# Patient Record
Sex: FEMALE | Race: WHITE | NOT HISPANIC OR LATINO | Employment: OTHER | URBAN - METROPOLITAN AREA
[De-identification: names, ages, dates, MRNs, and addresses within clinical notes are randomized per-mention and may not be internally consistent; named-entity substitution may affect disease eponyms.]

---

## 2017-01-16 ENCOUNTER — ONCOLOGY VISIT (OUTPATIENT)
Dept: ONCOLOGY | Facility: CLINIC | Age: 77
End: 2017-01-16
Attending: PHYSICIAN ASSISTANT
Payer: MEDICARE

## 2017-01-16 VITALS
RESPIRATION RATE: 16 BRPM | WEIGHT: 163.5 LBS | DIASTOLIC BLOOD PRESSURE: 82 MMHG | TEMPERATURE: 97.7 F | BODY MASS INDEX: 23.46 KG/M2 | HEART RATE: 72 BPM | SYSTOLIC BLOOD PRESSURE: 161 MMHG | OXYGEN SATURATION: 91 %

## 2017-01-16 DIAGNOSIS — C50.912 MALIGNANT NEOPLASM OF LEFT FEMALE BREAST, UNSPECIFIED SITE OF BREAST: Primary | ICD-10-CM

## 2017-01-16 PROCEDURE — 99213 OFFICE O/P EST LOW 20 MIN: CPT | Mod: ZP | Performed by: PHYSICIAN ASSISTANT

## 2017-01-16 PROCEDURE — 99212 OFFICE O/P EST SF 10 MIN: CPT | Mod: ZF

## 2017-01-16 ASSESSMENT — PAIN SCALES - GENERAL: PAINLEVEL: NO PAIN (0)

## 2017-01-16 NOTE — NURSING NOTE
"Camille Thompson is a 76 year old female who presents for:  Chief Complaint   Patient presents with     Oncology Clinic Visit     Breast Cancer        Initial Vitals:  /82 mmHg  Pulse 72  Temp(Src) 97.7  F (36.5  C) (Oral)  Resp 16  Wt 74.163 kg (163 lb 8 oz)  SpO2 91% Estimated body mass index is 23.46 kg/(m^2) as calculated from the following:    Height as of 9/16/16: 1.778 m (5' 10\").    Weight as of this encounter: 74.163 kg (163 lb 8 oz).. There is no height on file to calculate BSA. BP completed using cuff size: regular  No Pain (0) No LMP recorded. Patient is postmenopausal. Allergies and medications reviewed.     Medications: Medication refills not needed today.  Pharmacy name entered into [facility]:    [facility] - [place], [place] - [address] [id number] - [place], [place] - [address]     Comments: Patient is not having any pain today.     6 minutes for nursing intake (face to face time)   Ynes Camargo CMA         "

## 2017-01-16 NOTE — PROGRESS NOTES
DIAGNOSIS:  Stage I (T1 N0 M0) infiltrating ductal carcinoma of the left breast, ER negative, [place] negative, HER-2/batsheva negative. The patient is status post bilateral mastectomy on 01/29/2015. Pathology revealed an infiltrating ductal carcinoma, [place] grade 3. Tumor size was 1.1 cm. Margins were not involved. She had zero of 5 sentinel lymph nodes positive. The right breast showed no malignancy. The patient completed weekly Taxol x 11 from 03/06/2015 to 05/15/2015. Last dose of Taxol was held due to LE erythema.  She completed dose dense AC (adriamycin/cytoxan) x 4 cycles from 06/05/2015 to 07/24/2015.    INTERVAL HISTORY:  Ms. Thompson returns to clinic today for followup of her breast carcinoma.  She is overall feeling well at this time.  Her review of systems is negative; please see below.  She does continue to have peripheral neuropathy on the bottom of her feet and toes which is stable since treatment.  She also continues to have mild edema but had vein surgery which she states improved the edema.  She is going to get back into an exercise program.     ROS:  Answers for HPI/ROS submitted by the patient on 1/2/2017   General Symptoms: No  Skin Symptoms: No  HENT Symptoms: No  EYE SYMPTOMS: No  HEART SYMPTOMS: No  LUNG SYMPTOMS: No  INTESTINAL SYMPTOMS: No  URINARY SYMPTOMS: No  GYNECOLOGIC SYMPTOMS: No  BREAST SYMPTOMS: No  SKELETAL SYMPTOMS: No  BLOOD SYMPTOMS: No  NERVOUS SYSTEM SYMPTOMS: No  MENTAL HEALTH SYMPTOMS: No    MEDICATIONS:   Current Outpatient Prescriptions   Medication Sig Dispense Refill     Multiple Vitamins-Minerals (MULTIVITAMIN & MINERAL PO) one tablet daily       GABAPENTIN PO Take by mouth daily       triamcinolone (KENALOG) 0.1 % cream Apply twice daily as needed to stasis dermatitis of lower legs (itchy, red skin). Never for face or groin. 80 g 2     furosemide (LASIX) 20 MG tablet Take 1 tablet (20 mg) by mouth 2 times daily 60 tablet 1     AMLODIPINE BESYLATE PO Take 5 mg by mouth daily        cyanocobalamin (VITAMIN  B-12) 1000 MCG tablet Take 1,000 mcg by mouth daily       VITAMIN D, CHOLECALCIFEROL, PO Take 2,000 Units by mouth daily       Citalopram Hydrobromide (CELEXA PO) Take 40 mg by mouth daily Patient takes  0.5 tabs by mouth       Rizatriptan Benzoate (MAXALT-MLT PO) Take 10 mg by mouth           ALLERGIES:    Allergies   Allergen Reactions     Codeine Nausea     Penicillins Dermatitis and Swelling       PHYSICAL EXAMINATION:  Vitals: /82 mmHg  Pulse 72  Temp(Src) 97.7  F (36.5  C) (Oral)  Resp 16  Wt 74.163 kg (163 lb 8 oz)  SpO2 91%  GENERAL:  A pleasant person in no acute distress.   HEENT:  Sclerae are nonicteric.    NECK:  Supple.   LYMPH NODES:  No peripheral lymphadenopathy noted in the axillary, supraclavicular, or cervical regions.   LUNGS:  Clear to auscultation bilaterally.   HEART:  Regular rate and rhythm, with no murmur appreciated.   ABDOMEN:  Bowel sounds are active.  Soft and nontender.  No hepatosplenomegaly or other masses appreciated.  LOWER EXTREMITIES:  Without pitting edema to the knees bilaterally.   NEUROLOGICAL:  Alert/orientated/able to answer all questions.  CN grossly intact.  BREAST:  Bilateral breast reconstructions with implants intact.  No masses or nodules on the anterior chest wall.       IMPRESSION/PLAN:   Stage I (T1 N0 M0) infiltrating ductal carcinoma of the left breast; ER, ND, and HER2/batsheva negative. Ms. Thompson continues to do well at this time.  She does not have any signs or symptoms that would suggest recurrence of her breast carcinoma.  Given her bilateral mastectomies with reconstruction, no routine breast imaging is warranted.  I encouraged 150 minutes of moderate exercise per week for overall health.  I will have her follow up with Dr. Chavez in 4 months.      If there are no interval concerns, the patient will follow up in 4 months.     Brenda Goodwin PA-C

## 2017-01-16 NOTE — Clinical Note
1/16/2017      RE: Camille Thompson  [address] [place] [address] [address] [place] [address]       DIAGNOSIS:  Stage I (T1 N0 M0) infiltrating ductal carcinoma of the left breast, ER negative, [place] negative, HER-2/batsheva negative. The patient is status post bilateral mastectomy on 01/29/2015. Pathology revealed an infiltrating ductal carcinoma, [place] grade 3. Tumor size was 1.1 cm. Margins were not involved. She had zero of 5 sentinel lymph nodes positive. The right breast showed no malignancy. The patient completed weekly Taxol x 11 from 03/06/2015 to 05/15/2015. Last dose of Taxol was held due to LE erythema.  She completed dose dense AC (adriamycin/cytoxan) x 4 cycles from 06/05/2015 to 07/24/2015.    INTERVAL HISTORY:  Ms. Thompson returns to clinic today for followup of her breast carcinoma.  She is overall feeling well at this time.  Her review of systems is negative; please see below.  She does continue to have peripheral neuropathy on the bottom of her feet and toes which is stable since treatment.  She also continues to have mild edema but had vein surgery which she states improved the edema.  She is going to get back into an exercise program.       MEDICATIONS:   Current Outpatient Prescriptions   Medication Sig Dispense Refill     Multiple Vitamins-Minerals (MULTIVITAMIN & MINERAL PO) one tablet daily       GABAPENTIN PO Take by mouth daily       triamcinolone (KENALOG) 0.1 % cream Apply twice daily as needed to stasis dermatitis of lower legs (itchy, red skin). Never for face or groin. 80 g 2     furosemide (LASIX) 20 MG tablet Take 1 tablet (20 mg) by mouth 2 times daily 60 tablet 1     AMLODIPINE BESYLATE PO Take 5 mg by mouth daily       cyanocobalamin (VITAMIN  B-12) 1000 MCG tablet Take 1,000 mcg by mouth daily       VITAMIN D, CHOLECALCIFEROL, PO Take 2,000 Units by mouth daily       Citalopram Hydrobromide (CELEXA PO) Take 40 mg by mouth daily Patient takes  0.5 tabs by mouth       Rizatriptan Benzoate (MAXALT-MLT  PO) Take 10 mg by mouth           ALLERGIES:    Allergies   Allergen Reactions     Codeine Nausea     Penicillins Dermatitis and Swelling       PHYSICAL EXAMINATION:  Vitals: /82 mmHg  Pulse 72  Temp(Src) 97.7  F (36.5  C) (Oral)  Resp 16  Wt 74.163 kg (163 lb 8 oz)  SpO2 91%  GENERAL:  A pleasant person in no acute distress.   HEENT:  Sclerae are nonicteric.    NECK:  Supple.   LYMPH NODES:  No peripheral lymphadenopathy noted in the axillary, supraclavicular, or cervical regions.   LUNGS:  Clear to auscultation bilaterally.   HEART:  Regular rate and rhythm, with no murmur appreciated.   ABDOMEN:  Bowel sounds are active.  Soft and nontender.  No hepatosplenomegaly or other masses appreciated.  LOWER EXTREMITIES:  Without pitting edema to the knees bilaterally.   NEUROLOGICAL:  Alert/orientated/able to answer all questions.  CN grossly intact.  BREAST:  Bilateral breast reconstructions with implants intact.  No masses or nodules on the anterior chest wall.       IMPRESSION/PLAN:   Stage I (T1 N0 M0) infiltrating ductal carcinoma of the left breast; ER, [place], and HER2/batsheva negative. Ms. Thompson continues to do well at this time.  She does not have any signs or symptoms that would suggest recurrence of her breast carcinoma.  Given her bilateral mastectomies with reconstruction, no routine breast imaging is warranted.  I encouraged 150 minutes of moderate exercise per week for overall health.  I will have her follow up with Dr. Chavez in 4 months.      If there are no interval concerns, the patient will follow up in 4 months.     Brenda Goodwin PA-C

## 2017-01-16 NOTE — Clinical Note
1/16/2017       RE: Camille Thompson  [address] [place] [address] [address] [place] [address]     Dear Colleague,    Thank you for referring your patient, Camille Thompsno, to the [facility]. Please see a copy of my visit note below.    DIAGNOSIS:  Stage I (T1 N0 M0) infiltrating ductal carcinoma of the left breast, ER negative, [place] negative, HER-2/batsheva negative. The patient is status post bilateral mastectomy on 01/29/2015. Pathology revealed an infiltrating ductal carcinoma, [place] grade 3. Tumor size was 1.1 cm. Margins were not involved. She had zero of 5 sentinel lymph nodes positive. The right breast showed no malignancy. The patient completed weekly Taxol x 11 from 03/06/2015 to 05/15/2015. Last dose of Taxol was held due to LE erythema.  She completed dose dense AC (adriamycin/cytoxan) x 4 cycles from 06/05/2015 to 07/24/2015.    INTERVAL HISTORY:   Dictation on: 01/16/2017 12:53 PM by: ALVARO LAYNE [[id number]]     ROS:  Answers for HPI/ROS submitted by the patient on 1/2/2017   General Symptoms: No  Skin Symptoms: No  HENT Symptoms: No  EYE SYMPTOMS: No  HEART SYMPTOMS: No  LUNG SYMPTOMS: No  INTESTINAL SYMPTOMS: No  URINARY SYMPTOMS: No  GYNECOLOGIC SYMPTOMS: No  BREAST SYMPTOMS: No  SKELETAL SYMPTOMS: No  BLOOD SYMPTOMS: No  NERVOUS SYSTEM SYMPTOMS: No  MENTAL HEALTH SYMPTOMS: No    MEDICATIONS:   Current Outpatient Prescriptions   Medication Sig Dispense Refill     Multiple Vitamins-Minerals (MULTIVITAMIN & MINERAL PO) one tablet daily       GABAPENTIN PO Take by mouth daily       triamcinolone (KENALOG) 0.1 % cream Apply twice daily as needed to stasis dermatitis of lower legs (itchy, red skin). Never for face or groin. 80 g 2     furosemide (LASIX) 20 MG tablet Take 1 tablet (20 mg) by mouth 2 times daily 60 tablet 1     AMLODIPINE BESYLATE PO Take 5 mg by mouth daily       cyanocobalamin (VITAMIN  B-12) 1000 MCG tablet Take 1,000 mcg by mouth daily       VITAMIN D, CHOLECALCIFEROL, PO Take 2,000  Units by mouth daily       Citalopram Hydrobromide (CELEXA PO) Take 40 mg by mouth daily Patient takes  0.5 tabs by mouth       Rizatriptan Benzoate (MAXALT-MLT PO) Take 10 mg by mouth           ALLERGIES:    Allergies   Allergen Reactions     Codeine Nausea     Penicillins Dermatitis and Swelling       PHYSICAL EXAMINATION:  Vitals: /82 mmHg  Pulse 72  Temp(Src) 97.7  F (36.5  C) (Oral)  Resp 16  Wt 74.163 kg (163 lb 8 oz)  SpO2 91%  GENERAL:  A pleasant person in no acute distress.   HEENT:  Sclerae are nonicteric.    NECK:  Supple.   LYMPH NODES:  No peripheral lymphadenopathy noted in the axillary, supraclavicular, or cervical regions.   LUNGS:  Clear to auscultation bilaterally.   HEART:  Regular rate and rhythm, with no murmur appreciated.   ABDOMEN:  Bowel sounds are active.  Soft and nontender.  No hepatosplenomegaly or other masses appreciated.  LOWER EXTREMITIES:  Without pitting edema to the knees bilaterally.   NEUROLOGICAL:  Alert/orientated/able to answer all questions.  CN grossly intact.  BREAST:  Bilateral breast reconstructions with implants intact.  No masses or nodules on the anterior chest wall.       IMPRESSION/PLAN:    Dictation on: 01/16/2017 12:55 PM by: BRENDA LAYNE [[id number]]     Brenda Layne PA-C    Again, thank you for allowing me to participate in the care of your patient.      Sincerely,    Brenda Layne PA-C

## 2017-03-23 ENCOUNTER — TRANSFERRED RECORDS (OUTPATIENT)
Dept: HEALTH INFORMATION MANAGEMENT | Facility: CLINIC | Age: 77
End: 2017-03-23

## 2017-05-17 ASSESSMENT — ENCOUNTER SYMPTOMS
NUMBNESS: 1
JOINT SWELLING: 0
DISTURBANCES IN COORDINATION: 0
SEIZURES: 0
HOARSE VOICE: 0
TREMORS: 0
TINGLING: 0
SINUS PAIN: 0
TROUBLE SWALLOWING: 0
DOUBLE VISION: 0
EYE WATERING: 0
MUSCLE CRAMPS: 0
MEMORY LOSS: 0
MYALGIAS: 1
WEAKNESS: 0
SMELL DISTURBANCE: 0
TASTE DISTURBANCE: 0
SORE THROAT: 0
STIFFNESS: 0
PARALYSIS: 0
MUSCLE WEAKNESS: 0
NECK PAIN: 0
EYE IRRITATION: 0
SPEECH CHANGE: 0
DIZZINESS: 0
SINUS CONGESTION: 0
BACK PAIN: 0
HEADACHES: 0
EYE PAIN: 0
ARTHRALGIAS: 0
NECK MASS: 0
EYE REDNESS: 0

## 2017-05-19 ENCOUNTER — ONCOLOGY VISIT (OUTPATIENT)
Dept: ONCOLOGY | Facility: CLINIC | Age: 77
End: 2017-05-19
Attending: INTERNAL MEDICINE
Payer: MEDICARE

## 2017-05-19 VITALS
TEMPERATURE: 97.1 F | WEIGHT: 164.6 LBS | OXYGEN SATURATION: 98 % | HEIGHT: 70 IN | DIASTOLIC BLOOD PRESSURE: 76 MMHG | RESPIRATION RATE: 16 BRPM | BODY MASS INDEX: 23.56 KG/M2 | SYSTOLIC BLOOD PRESSURE: 132 MMHG | HEART RATE: 84 BPM

## 2017-05-19 DIAGNOSIS — C50.912 MALIGNANT NEOPLASM OF LEFT FEMALE BREAST, UNSPECIFIED SITE OF BREAST: Primary | ICD-10-CM

## 2017-05-19 PROCEDURE — 99213 OFFICE O/P EST LOW 20 MIN: CPT | Mod: GC | Performed by: INTERNAL MEDICINE

## 2017-05-19 PROCEDURE — 99212 OFFICE O/P EST SF 10 MIN: CPT | Mod: ZF

## 2017-05-19 RX ORDER — RIZATRIPTAN BENZOATE 10 MG/1
TABLET ORAL
COMMUNITY
End: 2017-05-19

## 2017-05-19 ASSESSMENT — PAIN SCALES - GENERAL: PAINLEVEL: NO PAIN (0)

## 2017-05-19 NOTE — LETTER
5/19/2017       RE: Camille Thompson  [address] [place] [address] [address] [place] [address]     Dear Colleague,    Thank you for referring your patient, Camille Thompson, to the [facility]. Please see a copy of my visit note below.    DIAGNOSIS:  Stage I (T1 N0 M0) infiltrating ductal carcinoma of the left breast, ER negative, [place] negative, HER-2/batsheva negative. The patient is status post bilateral mastectomy on 01/29/2015. Pathology revealed an infiltrating ductal carcinoma, [place] grade 3. Tumor size was 1.1 cm. Margins were not involved. She had zero of 5 sentinel lymph nodes positive. The right breast showed no malignancy. The patient completed weekly Taxol x 11 from 03/06/2015 to 05/15/2015. Last dose of Taxol was held due to LE erythema.  She completed dose dense AC (adriamycin/cytoxan) x 4 cycles from 06/05/2015 to 07/24/2015.    INTERVAL HISTORY:  Ms. Thompson returns to the clinic today for followup of her breast carcinoma.  She has been doing well since last visit.  She had bilateral great saphenous sclerotherapy in 9/2016.  She feels that this has maybe slightly helped.  She is still having numbness in her feet (and some in her hands) which she thinks is getting slightly worse.  It is not painful and she uses gabapentin.  She is able to walk and climb stairs.  She has not tripped.  She does note that she almost tripped over her cat about 2 weeks ago and pulled a muscle in her buttocks.  She also reports damaging her right shoulder while lifting weights at the [facility] and then hurt it further during subsequent physical therapy.  She saw an orthopedic surgeon and reportedly tore some cartilage and a muscle detached.  She is undergoing physical therapy again, but may need surgery in the future.      Otherwise, she denies new bone pain or new lumps/bumps.      ROS:  The rest of the review of systems is negative..     MEDICATIONS:   Current Outpatient Prescriptions   Medication Sig Dispense Refill     GABAPENTIN  "PO Take 300 mg by mouth daily 100 mg in am, 300 mg in PM       triamcinolone (KENALOG) 0.1 % cream Apply twice daily as needed to stasis dermatitis of lower legs (itchy, red skin). Never for face or groin. 80 g 2     furosemide (LASIX) 20 MG tablet Take 1 tablet (20 mg) by mouth 2 times daily 60 tablet 1     AMLODIPINE BESYLATE PO Take 5 mg by mouth daily       cyanocobalamin (VITAMIN  B-12) 1000 MCG tablet Take 1,000 mcg by mouth daily       VITAMIN D, CHOLECALCIFEROL, PO Take 2,000 Units by mouth daily       Citalopram Hydrobromide (CELEXA PO) Take 40 mg by mouth daily Patient takes  0.5 tabs by mouth       Rizatriptan Benzoate (MAXALT-MLT PO) Take 10 mg by mouth           ALLERGIES:    Allergies   Allergen Reactions     Codeine Nausea     Penicillins Dermatitis and Swelling       PHYSICAL EXAMINATION:  Vitals: /76  Pulse 84  Temp 97.1  F (36.2  C) (Oral)  Resp 16  Ht 1.778 m (5' 10\")  Wt 74.7 kg (164 lb 9.6 oz)  SpO2 98%  BMI 23.62 kg/m2  GENERAL:  A pleasant person in no acute distress.    HEENT: NC/AT  CV: RRR, no  Murmurs  PULM: CTAB  ABD: soft, NT/ND, no HSM  EXTREMITIES: Some chronic venous stasis changes in legs.    PROBLEMS:  1. Stage 1 TNBC s/p paclitaxel x 12 and AC x 4  2. Lower extremity edema secondary to incompetent valves due to taxol chemo?    IMPRESSION:   Ms. Thompson is doing well.  She is now two years out from surgery and chemotherapy.  No evidence of recurrence on history or examination today.      We will continue to follow her at regular intervals.      PLAN:  1.  RTC in 6 months.  2. Contact us sooner as necessary.    Pt seen and discussed with Dr. Chavez.    Trevor Ingram MD  Hem/onc fellow    I've seen and examined the patient with Dr. Ingram.  I agree with his assessment and I have edited this document.    Nacho Chavez MD        "

## 2017-05-19 NOTE — NURSING NOTE
"Oncology Rooming Note    May 19, 2017 10:34 AM   Camille Thompson is a 54 year old female who presents for: Oncology Clinic Visit    Initial Vitals: /76  Pulse 84  Temp 97.1  F (36.2  C) (Oral)  Resp 16  Ht 1.778 m (5' 10\")  Wt 74.7 kg (164 lb 9.6 oz)  SpO2 98%  BMI 23.62 kg/m2 Estimated body mass index is 23.62 kg/(m^2) as calculated from the following:    Height as of this encounter: 1.778 m (5' 10\").    Weight as of this encounter: 74.7 kg (164 lb 9.6 oz). Body surface area is 1.92 meters squared.  No Pain (0) Comment: Data Unavailable   No LMP recorded. Patient is postmenopausal.  Allergies reviewed: Yes  Medications reviewed: Yes    Medications: Medication refills not needed today.  Pharmacy name entered into [employer]:    [facility] - [place], [place] - [address] [facility] [id number] - [place], [place] - [address]     Clinical concerns:    6 minutes for nursing intake (face to face time)     Jillian Dos Santos CMA                              "

## 2017-05-19 NOTE — PROGRESS NOTES
DIAGNOSIS:  Stage I (T1 N0 M0) infiltrating ductal carcinoma of the left breast, ER negative, [place] negative, HER-2/batsheva negative. The patient is status post bilateral mastectomy on 01/29/2015. Pathology revealed an infiltrating ductal carcinoma, [place] grade 3. Tumor size was 1.1 cm. Margins were not involved. She had zero of 5 sentinel lymph nodes positive. The right breast showed no malignancy. The patient completed weekly Taxol x 11 from 03/06/2015 to 05/15/2015. Last dose of Taxol was held due to LE erythema.  She completed dose dense AC (adriamycin/cytoxan) x 4 cycles from 06/05/2015 to 07/24/2015.    INTERVAL HISTORY:  Ms. Thompson returns to the clinic today for followup of her breast carcinoma.  She has been doing well since last visit.  She had bilateral great saphenous sclerotherapy in 9/2016.  She feels that this has maybe slightly helped.  She is still having numbness in her feet (and some in her hands) which she thinks is getting slightly worse.  It is not painful and she uses gabapentin.  She is able to walk and climb stairs.  She has not tripped.  She does note that she almost tripped over her cat about 2 weeks ago and pulled a muscle in her buttocks.  She also reports damaging her right shoulder while lifting weights at the [facility] and then hurt it further during subsequent physical therapy.  She saw an orthopedic surgeon and reportedly tore some cartilage and a muscle detached.  She is undergoing physical therapy again, but may need surgery in the future.      Otherwise, she denies new bone pain or new lumps/bumps.      ROS:  The rest of the review of systems is negative..     MEDICATIONS:   Current Outpatient Prescriptions   Medication Sig Dispense Refill     GABAPENTIN PO Take 300 mg by mouth daily 100 mg in am, 300 mg in PM       triamcinolone (KENALOG) 0.1 % cream Apply twice daily as needed to stasis dermatitis of lower legs (itchy, red skin). Never for face or groin. 80 g 2     furosemide (LASIX)  "20 MG tablet Take 1 tablet (20 mg) by mouth 2 times daily 60 tablet 1     AMLODIPINE BESYLATE PO Take 5 mg by mouth daily       cyanocobalamin (VITAMIN  B-12) 1000 MCG tablet Take 1,000 mcg by mouth daily       VITAMIN D, CHOLECALCIFEROL, PO Take 2,000 Units by mouth daily       Citalopram Hydrobromide (CELEXA PO) Take 40 mg by mouth daily Patient takes  0.5 tabs by mouth       Rizatriptan Benzoate (MAXALT-MLT PO) Take 10 mg by mouth           ALLERGIES:    Allergies   Allergen Reactions     Codeine Nausea     Penicillins Dermatitis and Swelling       PHYSICAL EXAMINATION:  Vitals: /76  Pulse 84  Temp 97.1  F (36.2  C) (Oral)  Resp 16  Ht 1.778 m (5' 10\")  Wt 74.7 kg (164 lb 9.6 oz)  SpO2 98%  BMI 23.62 kg/m2  GENERAL:  A pleasant person in no acute distress.    HEENT: NC/AT  CV: RRR, no  Murmurs  PULM: CTAB  ABD: soft, NT/ND, no HSM  EXTREMITIES: Some chronic venous stasis changes in legs.    PROBLEMS:  1. Stage 1 TNBC s/p paclitaxel x 12 and AC x 4  2. Lower extremity edema secondary to incompetent valves due to taxol chemo?    IMPRESSION:   Ms. Thompson is doing well.  She is now two years out from surgery and chemotherapy.  No evidence of recurrence on history or examination today.      We will continue to follow her at regular intervals.      PLAN:  1.  RTC in 6 months.  2. Contact us sooner as necessary.    Pt seen and discussed with Dr. Chavez.    Trevor Ingram MD  Hem/onc fellow    I've seen and examined the patient with Dr. Ingram.  I agree with his assessment and I have edited this document.    Nacho Chavez MD  "

## 2017-08-09 ENCOUNTER — TELEPHONE (OUTPATIENT)
Dept: INTERVENTIONAL RADIOLOGY/VASCULAR | Facility: CLINIC | Age: 77
End: 2017-08-09

## 2017-08-09 DIAGNOSIS — R60.0 EDEMA OF BOTH LEGS: Primary | ICD-10-CM

## 2017-08-09 NOTE — TELEPHONE ENCOUNTER
Previsit call to further asses pt's lower extremity symptoms.   Request that she return my call. Left direct line     Camille Stein RN, BSN  Interventional Radiology Nurse Coordinator   Phone: [phone]

## 2017-08-23 ENCOUNTER — OFFICE VISIT (OUTPATIENT)
Dept: RADIOLOGY | Facility: CLINIC | Age: 77
End: 2017-08-23

## 2017-08-23 VITALS
DIASTOLIC BLOOD PRESSURE: 66 MMHG | OXYGEN SATURATION: 97 % | HEART RATE: 72 BPM | SYSTOLIC BLOOD PRESSURE: 141 MMHG | RESPIRATION RATE: 16 BRPM

## 2017-08-23 DIAGNOSIS — I83.893 VARICOSE VEINS OF BILATERAL LOWER EXTREMITIES WITH OTHER COMPLICATIONS: Primary | ICD-10-CM

## 2017-08-23 ASSESSMENT — PAIN SCALES - GENERAL: PAINLEVEL: NO PAIN (0)

## 2017-08-23 NOTE — NURSING NOTE
Chief Complaint   Patient presents with     Consult     Venous insuff/feet turning brown in color.  testing complete        Vitals:    08/23/17 1423   BP: 141/66   BP Location: Right arm   Pulse: 72   Resp: 16   SpO2: 97%       There is no height or weight on file to calculate BMI.                 Carolina Greenwood LPN

## 2017-09-05 NOTE — PROGRESS NOTES
Interventional Radiology Clinic Visit    Date of this visit: 8/23/2017    Camille Thompson presents for consultation for evaluation of lower extremity venous stasis.    Primary Physician: Reanna Bedolla      History Of Present Illness:    Camille Thompson is a 76 year old female with a diagnosis of breast carcinoma in remission referred from evaluation of bilateral, left greater than right, lower extremity venous stasis/swelling. She has a remote history of left leg DVT which lead to the formation of a venous ulcer along her ankle.     Since that time she has suffered from swelling of the lower legs particularly her left side and her ankle/foot. She did receive what appears to be sclerotherapy of her GSVs at some point which helped briefly. She has been wearing compression stockings for greater than 6 months, with some benefit.     She denies lower extremity fatigue, tiredness, heaviness, itchiness.    Review of Systems:    10 Point ROS is positive for what is described in the HPI. Otherwise, the remainder of the ROS is negative.    Past Medical/Surgical History:    Past Medical History:   Diagnosis Date     Basal cell carcinoma      Past Surgical History:   Procedure Laterality Date     BIOPSY OF SKIN LESION         Current Medications:    Current Outpatient Prescriptions   Medication Sig Dispense Refill     order for DME Please measure and distribute 1 pair of 20mmHg - 30mmHg thigh high open or closed toe compression stockings. Jobst ultrasheer or equivalent. 2 each 6     GABAPENTIN PO Take 300 mg by mouth daily 100 mg in am, 300 mg in PM       triamcinolone (KENALOG) 0.1 % cream Apply twice daily as needed to stasis dermatitis of lower legs (itchy, red skin). Never for face or groin. 80 g 2     furosemide (LASIX) 20 MG tablet Take 1 tablet (20 mg) by mouth 2 times daily 60 tablet 1     AMLODIPINE BESYLATE PO Take 5 mg by mouth daily       cyanocobalamin (VITAMIN  B-12) 1000 MCG tablet Take 1,000 mcg by mouth daily        VITAMIN D, CHOLECALCIFEROL, PO Take 2,000 Units by mouth daily       Citalopram Hydrobromide (CELEXA PO) Take 40 mg by mouth daily Patient takes  0.5 tabs by mouth       Allergies:    Codeine and Penicillins    Family History:    Family History   Problem Relation Age of Onset     Skin Cancer No family hx of      no family HX of skin cancer       Social History:    Social History     Social History     Marital status:      Spouse name: N/A     Number of children: N/A     Years of education: N/A     Social History Main Topics     Smoking status: Former Smoker     Smokeless tobacco: Never Used     Alcohol use None     Drug use: None     Sexual activity: Not Asked     Other Topics Concern     None     Social History Narrative       Physical Exam:    /66 (BP Location: Right arm)  Pulse 72  Resp 16  SpO2 97%  Breastfeeding? No     GENERAL APPEARANCE: alert, nad  HEENT: nc/at  RESP: cta  CARDIOVASCULAR: rrr  VASCULAR: normal pulses b/l. Bronzing discoloration overlying the lower anterior calfs, mild +1 pitting edema overlying left ankle. No varicose veins. No telangectasias. No lipodermatosclerotic change.    Laboratory Studies:    None    Imaging:     I reviewed the bilateral lower extremity venous incompetency ultrasound obtained which reveals no deep venous clot. She has areas of wall thickening and partial compressibility of the SSV bilaterally. Her SSVs appear incompetent diffusely bilaterally. In her left SSV territory, there is an incompetent varicosity in the mid calf.     ASSESSMENT/PLAN:      Camille Thompson is a 76 year old female with superficial venous incompetency CEAP 3/4 worse in her left calf/ankle/foot, with history of previous wound, and clear signs of prior superifical thrombophlebitis in the SSV.     Her left SSV is diffusely incompetent and she has a prominent incompetent varicosity off of it in the mid thigh as well as a couple of incompetent perforators in the left calf.     Her  right SSV shows incompetency but has segments of residual synechiae or thickening which may prove difficult to ablate, in which case sclerotherapy would be more appropriate.    Plan:  1. Left leg SSV EVLA and incompetent varicose and  vein sclerotherapy.  2. Right leg SSV EVLA or sclerotherapy.    A total of 45 minutes was spent in care for the patient, of which >50% was spent in counseling and co-ordination of care.     It was a pleasure seeing the patient.     SignedDewey M.D.  Department of Interventional Radiology  [facility]  Patient Care Team:  Reanna Bedolla as PCP - General (Internal Medicine)  Nacho Chavez MD as MD (Oncology)  Citlali Drake MD as MD (Dermatology)  Rahul Celaya MD as Resident (Radiology)  SHAYNE MATUTE

## 2017-09-12 ENCOUNTER — TELEPHONE (OUTPATIENT)
Dept: INTERVENTIONAL RADIOLOGY/VASCULAR | Facility: CLINIC | Age: 77
End: 2017-09-12

## 2017-09-12 DIAGNOSIS — I83.893 VARICOSE VEINS OF BILATERAL LOWER EXTREMITIES WITH OTHER COMPLICATIONS: Primary | ICD-10-CM

## 2017-09-12 NOTE — TELEPHONE ENCOUNTER
I called and left a voicemail to get patient scheduled for EVLA left SSV and sclerotherapy with Dr. Orosco.  I left some possible date options of 9/27, 9/29 or 10/5 and asked she return my call.  NAI Wells, RN, BSN  Interventional Radiology Care Coordinator   Phone:  [phone]

## 2017-09-15 ENCOUNTER — TELEPHONE (OUTPATIENT)
Dept: INTERVENTIONAL RADIOLOGY/VASCULAR | Facility: CLINIC | Age: 77
End: 2017-09-15

## 2017-09-15 DIAGNOSIS — I83.893 VARICOSE VEINS OF BILATERAL LOWER EXTREMITIES WITH OTHER COMPLICATIONS: Primary | ICD-10-CM

## 2017-09-15 NOTE — TELEPHONE ENCOUNTER
I called and spoke with Althea, she is scheduled per her preference for IR procedure for Varicose veins with Dr. Castelan and a [id number] message was sent with appt details.  Pt will contact me with questions or concerns.  NAI Wells RN, BSN  Interventional Radiology Care Coordinator   Phone:  [phone]

## 2017-09-27 ENCOUNTER — TELEPHONE (OUTPATIENT)
Dept: INTERVENTIONAL RADIOLOGY/VASCULAR | Facility: CLINIC | Age: 77
End: 2017-09-27

## 2017-09-29 ENCOUNTER — HOSPITAL ENCOUNTER (OUTPATIENT)
Facility: CLINIC | Age: 77
Discharge: HOME | End: 2017-09-29
Attending: RADIOLOGY | Admitting: RADIOLOGY
Payer: MEDICARE

## 2017-09-29 ENCOUNTER — APPOINTMENT (OUTPATIENT)
Dept: INTERVENTIONAL RADIOLOGY/VASCULAR | Facility: CLINIC | Age: 77
End: 2017-09-29
Attending: RADIOLOGY
Payer: MEDICARE

## 2017-09-29 ENCOUNTER — APPOINTMENT (OUTPATIENT)
Dept: MEDSURG UNIT | Facility: CLINIC | Age: 77
End: 2017-09-29
Attending: RADIOLOGY
Payer: MEDICARE

## 2017-09-29 VITALS
HEART RATE: 70 BPM | SYSTOLIC BLOOD PRESSURE: 146 MMHG | OXYGEN SATURATION: 98 % | DIASTOLIC BLOOD PRESSURE: 53 MMHG | TEMPERATURE: 97.6 F | RESPIRATION RATE: 16 BRPM

## 2017-09-29 DIAGNOSIS — I83.893 VARICOSE VEINS OF BILATERAL LOWER EXTREMITIES WITH OTHER COMPLICATIONS: ICD-10-CM

## 2017-09-29 PROCEDURE — 27210732 ZZH ACCESSORY CR1

## 2017-09-29 PROCEDURE — 25000125 ZZHC RX 250: Performed by: RADIOLOGY

## 2017-09-29 PROCEDURE — A9270 NON-COVERED ITEM OR SERVICE: HCPCS | Mod: GY | Performed by: RADIOLOGY

## 2017-09-29 PROCEDURE — 25000132 ZZH RX MED GY IP 250 OP 250 PS 637: Mod: GY | Performed by: RADIOLOGY

## 2017-09-29 PROCEDURE — 36478 ENDOVENOUS LASER 1ST VEIN: CPT

## 2017-09-29 PROCEDURE — 27210903 ZZH KIT CR5

## 2017-09-29 PROCEDURE — 25000128 H RX IP 250 OP 636: Performed by: RADIOLOGY

## 2017-09-29 PROCEDURE — 40000166 ZZH STATISTIC PP CARE STAGE 1

## 2017-09-29 RX ORDER — DIAZEPAM 5 MG
10 TABLET ORAL ONCE
Status: COMPLETED | OUTPATIENT
Start: 2017-09-29 | End: 2017-09-29

## 2017-09-29 RX ADMIN — DIAZEPAM 10 MG: 5 TABLET ORAL at 09:49

## 2017-09-29 RX ADMIN — LIDOCAINE HYDROCHLORIDE 5 ML: 10 INJECTION, SOLUTION EPIDURAL; INFILTRATION; INTRACAUDAL; PERINEURAL at 10:50

## 2017-09-29 RX ADMIN — Medication: at 11:00

## 2017-09-29 NOTE — IP AVS SNAPSHOT
MRN:[id number]                      After Visit Summary   9/29/2017    Camille Thompson    MRN: [id number]           Visit Information        Department      9/29/2017  8:09 AM [address] [facility]          Review of your medicines      UNREVIEWED medicines. Ask your doctor about these medicines        Dose / Directions    AMLODIPINE BESYLATE PO        Dose:  5 mg   Take 5 mg by mouth daily   Refills:  0       CELEXA PO   Used for:  Breast cancer, left (H)        Dose:  40 mg   Take 40 mg by mouth daily Patient takes  0.5 tabs by mouth   Refills:  0       cyanocobalamin 1000 MCG tablet   Commonly known as:  vitamin  B-12        Dose:  1000 mcg   Take 1,000 mcg by mouth daily   Refills:  0       furosemide 20 MG tablet   Commonly known as:  LASIX   Used for:  Edema        Dose:  20 mg   Take 1 tablet (20 mg) by mouth 2 times daily   Quantity:  60 tablet   Refills:  1       GABAPENTIN PO        Dose:  300 mg   Take 300 mg by mouth daily 100 mg in am, 300 mg in PM   Refills:  0       OXYCODONE HCL PO        Dose:  5 mg   Take 5 mg by mouth every 6 hours as needed   Refills:  0       triamcinolone 0.1 % cream   Commonly known as:  KENALOG   Used for:  Stasis dermatitis of both legs        Apply twice daily as needed to stasis dermatitis of lower legs (itchy, red skin). Never for face or groin.   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 Jobst ultrasheer or equivalent.   Quantity:  2 each   Refills:  6                Protect others around you: Learn how to safely use, store and  throw away your medicines at www.disposemymeds.org.         Follow-ups after your visit        Your next 10 appointments already scheduled     Oct 06, 2017 10:00 AM CDT   US LOWER EXTREMITY VENOUS DUPLEX LEFT with UCUSV1   [facility]  ([facility])    [address] [address] [phone]           Please bring a list of your medicines (including vitamins, minerals and over-the-counter drugs). Also, tell your doctor about any allergies you may have. Wear comfortable clothes and leave your valuables at home.  You do not need to do anything special to prepare for your exam.  Please call the Imaging Department at your exam site with any questions.              Future tests that were ordered for you     US Lower Extremity Venous Duplex Left                  Care Instructions        After Care Instructions     Discharge Instructions to print to the AVS       PRIOR TO DISCHARGE   - Please keep compression stocking on day and night for the first 3 days. The compression stocking may be removed after the first 24 hours to shower, but should then be put back on immediately afterwards. After the first 3 days, wear the compression stocking daily for the remainder of the 3 weeks, but may remove at night.    - Activity:  Please remain active following discharge and walk at least 20 minutes every day.    - Pain Medications  Ibuprofen 600 mg oral every 8 hours for mild pain or discomfort  Oxycodone/acetaminophen 5/325 1-2 oral every 6 hours as needed for more severe pain. You will receive a prescription before discharge.  - No hot tubs or hot baths and no vigorous lower extremity exercise (ie stair stepper, running or biking) for 1 week.   - You will have a follow up ultrasound of the treated leg in one week which will be scheduled before you leave.  - You will have a follow up IR clinic appointment and ultrasound in one month. You will be contacted by the  IR clinic RN with this appointment.  [phone].                  Further instructions from your care team       [facility]      CALL THE PHYSICIAN IF:    You develop nausea or vomiting    You develop hives or a rash or any unexplained itching      [facility] INTERVENTIONAL RADIOLOGY DEPARTMENT        Procedure Physician:                                 Date of procedure:September 29, 2017        Telephone numbers:     [phone]      Monday-Friday 8:00 am to 4:30 pm                                              [phone]       After 4:30 pm Monday-Friday, Weekends                                                                             & Holidays. Ask for the Interventional                                                                                     Radiologist on call. Someone is  available 24 hours/day        [facility] toll free number: [phone]  Monday-Friday 8:00 am to 4:30 pm                                                                                                                                                                                                                        Additional Information About Your Visit        [employer] Information     [employer] gives you secure access to your electronic health record. If you see a primary care provider, you can also send messages to your care team and make appointments. If you have questions, please call your primary care clinic.  If you do not have a primary care provider, please call [phone] and they will assist you.        Care EveryWhere ID     This is your Care EveryWhere ID. This could be used by other organizations to access your [place] medical records  [id number]        Your Vitals Were     Blood Pressure Pulse Temperature Respirations Pulse Oximetry       172/66 70 97.6  F (36.4  C) (Oral) 16 98%        Primary Care Provider Office Phone # Fax #    Reanna Bedolla [phone]      Equal Access to  Services     [facility]: Hadii lacie boswell algilma Robertali, waaxda luqadaha, qaybta kaalmada deisi, obdulia christensen . So [facility] [phone].    ATENCIÓN: Si habla cassie, tiene a nieves disposición servicios gratuitos de asistencia lingüística. Llame al [phone].    We comply with applicable federal civil rights laws and [place] laws. We do not discriminate on the basis of race, color, national origin, age, disability, sex, sexual orientation, or gender identity.            Thank you!     Thank you for choosing [facility] for your care. Our goal is always to provide you with excellent care. Hearing back from our patients is one way we can continue to improve our services. Please take a few minutes to complete the written survey that you may receive in the mail after you visit with us. Thank you!             Medication List: This is a list of all your medications and when to take them. Check marks below indicate your daily home schedule. Keep this list as a reference.      Medications           Morning Afternoon Evening Bedtime As Needed    AMLODIPINE BESYLATE PO   Take 5 mg by mouth daily                                CELEXA PO   Take 40 mg by mouth daily Patient takes  0.5 tabs by mouth                                cyanocobalamin 1000 MCG tablet   Commonly known as:  vitamin  B-12   Take 1,000 mcg by mouth daily                                furosemide 20 MG tablet   Commonly known as:  LASIX   Take 1 tablet (20 mg) by mouth 2 times daily                                GABAPENTIN PO   Take 300 mg by mouth daily 100 mg in am, 300 mg in PM                                order for DME   Please measure and distribute 1 pair of 20mmHg - 30mmHg thigh high open or closed toe compression stockings. Jobst ultrasheer or equivalent.                                OXYCODONE HCL PO   Take 5 mg by mouth every 6 hours as needed                                triamcinolone 0.1 % cream   Commonly  known as:  KENALOG   Apply twice daily as needed to stasis dermatitis of lower legs (itchy, red skin). Never for face or groin.                                VITAMIN D (CHOLECALCIFEROL) PO   Take 2,000 Units by mouth daily

## 2017-09-29 NOTE — DISCHARGE INSTRUCTIONS
[facility]      CALL THE PHYSICIAN IF:    You develop nausea or vomiting    You develop hives or a rash or any unexplained itching      [facility] INTERVENTIONAL RADIOLOGY DEPARTMENT        Procedure Physician:                                 Date of procedure:September 29, 2017        Telephone numbers:     [phone]      Monday-Friday 8:00 am to 4:30 pm                                              [phone]       After 4:30 pm Monday-Friday, Weekends                                                                             & Holidays. Ask for the Interventional                                                                                     Radiologist on call. Someone is  available 24 hours/day        [facility] toll free number: [phone]  Monday-Friday 8:00 am to 4:30 pm

## 2017-09-29 NOTE — PROGRESS NOTES
Pt admitted to 2A for bilateral lower leg microphleblectomy.  Valium given.   No labs needed. Consent done.   at bedside.

## 2017-09-29 NOTE — PROGRESS NOTES
Pt received from IR with RN, pt awake and alert, denies pain. Sites on back of left leg are dry and intact, elastic stocking on. Taking PO.

## 2017-09-29 NOTE — IP AVS SNAPSHOT
[address] [address]                                       After Visit Summary   9/29/2017    Camille Thompson    MRN: [id number]           After Visit Summary Signature Page     I have received my discharge instructions, and my questions have been answered. I have discussed any challenges I see with this plan with the nurse or doctor.    ..........................................................................................................................................  Patient/Patient Representative Signature      ..........................................................................................................................................  Patient Representative Print Name and Relationship to Patient    ..................................................               ................................................  Date                                            Time    ..........................................................................................................................................  Reviewed by Signature/Title    ...................................................              ..............................................  Date                                                            Time

## 2017-09-29 NOTE — PROGRESS NOTES
Pt tolerated PO, voided and ambulated without difficulty. VSS, pt denies pain. L leg site CDI. Bilateral compression stockings on legs. Pt verbalized understanding of discharge instructions. IV removed. Pt discharged to home

## 2017-09-29 NOTE — PROGRESS NOTES
Interventional Radiology Intra-procedural Nursing Note    Patient Name: Camille Thompson  Medical Record Number: [id number]  Today's Date: September 29, 2017    Start Time: 1047  End of procedure time: 1130  Procedure: leftl lower extremity varicose vein ablation  Report given to: tanvi Stanford  Time pt departs: 1143  Provider: Dr. Palomo      Patient arrives to IR 5 via cart from 2A.  Patient placed on monitors, hovermat used to transfer patient to IR table.  Patient prepped and draped in sterile fashion, timeout completed.  No injections per Dr. Palomo.  Patient will return in 3 weeks    Patient returned to 2a via cart.  Patient denies pain.    Other Notes:     Rafa Villalba RN

## 2017-09-29 NOTE — BRIEF OP NOTE
Interventional Radiology Brief Post Procedure Note    Procedure: IR STAB PHLEBECTOMY < 10 STABS    Proceduralist: Dewey Orosco MD    Assistant: Jj Garza MD    Time Out: Prior to the start of the procedure and with procedural staff participation, I verbally confirmed the patient s identity using two indicators, relevant allergies, that the procedure was appropriate and matched the consent or emergent situation, and that the correct equipment/implants were available. Immediately prior to starting the procedure I conducted the Time Out with the procedural staff and re-confirmed the patient s name, procedure, and site/side. (The Joint Commission universal protocol was followed.)  Yes    Medications   Medication Event Details Admin User Admin Time   NaCl 0.9 % 445 mL with lidocaine 1% with EPINEPHrine 1:100,000 50 mL, sodium bicarbonate 5 mL Medication Given by Other Route: Intracatheter; Scheduled Time:  9:00 AM; Comment: given by Dr. Palomo in IR procedure. Rafa Villalba, RN 9/29/2017 11:00 AM       Sedation: Minimal.    Findings: L SSV laser ablation    Estimated Blood Loss: Minimal    Fluoroscopy Time:  minute(s)    SPECIMENS: None    Complications: 1. None     Condition: Stable    Plan: 2a.  D/C w/ DVT US L LE    Comments: See dictated procedure note for full details.    Jj Garza MD

## 2017-10-04 ENCOUNTER — TELEPHONE (OUTPATIENT)
Dept: INTERVENTIONAL RADIOLOGY/VASCULAR | Facility: CLINIC | Age: 77
End: 2017-10-04

## 2017-10-04 DIAGNOSIS — I83.893 VARICOSE VEINS OF LOWER EXTREMITY WITH EDEMA, BILATERAL: Primary | ICD-10-CM

## 2017-10-09 ENCOUNTER — TELEPHONE (OUTPATIENT)
Dept: INTERVENTIONAL RADIOLOGY/VASCULAR | Facility: CLINIC | Age: 77
End: 2017-10-09

## 2017-10-25 ENCOUNTER — OFFICE VISIT (OUTPATIENT)
Dept: RADIOLOGY | Facility: CLINIC | Age: 77
End: 2017-10-25

## 2017-10-25 VITALS
RESPIRATION RATE: 17 BRPM | OXYGEN SATURATION: 100 % | SYSTOLIC BLOOD PRESSURE: 147 MMHG | HEART RATE: 67 BPM | DIASTOLIC BLOOD PRESSURE: 86 MMHG

## 2017-10-25 DIAGNOSIS — I87.8 VENOUS STASIS OF BOTH LOWER EXTREMITIES: Primary | ICD-10-CM

## 2017-10-25 ASSESSMENT — PAIN SCALES - GENERAL: PAINLEVEL: NO PAIN (0)

## 2017-10-25 NOTE — PROGRESS NOTES
Interventional Radiology Clinic Visit      Chief Complaint   Patient presents with     RECHECK     Follow up EVLA  to left leg      HPI: Ms. Thompson is a 77 year old female who presents to clinic for followup following left SSV laser ablation 8/23/2017. Since ablation, she has been doing well and is currently without complaint. She notes decreased bronzing of the skin of her left calf. She denies swelling or pain and has been wearing compression stockings daily.    PMH:   Past Medical History:   Diagnosis Date     Basal cell carcinoma        PSH:   Past Surgical History:   Procedure Laterality Date     BIOPSY OF SKIN LESION         Family History:   Family History   Problem Relation Age of Onset     Skin Cancer No family hx of      no family HX of skin cancer       Social History:   Social History   Substance Use Topics     Smoking status: Former Smoker     Smokeless tobacco: Never Used     Alcohol use Not on file     Medications:   Current Outpatient Prescriptions   Medication Sig Dispense Refill     OXYCODONE HCL PO Take 5 mg by mouth every 6 hours as needed       order for DME Please measure and distribute 1 pair of 20mmHg - 30mmHg thigh high open or closed toe compression stockings. Jobst ultrasheer or equivalent. 2 each 6     GABAPENTIN PO Take 300 mg by mouth daily 100 mg in am, 300 mg in PM       triamcinolone (KENALOG) 0.1 % cream Apply twice daily as needed to stasis dermatitis of lower legs (itchy, red skin). Never for face or groin. 80 g 2     furosemide (LASIX) 20 MG tablet Take 1 tablet (20 mg) by mouth 2 times daily 60 tablet 1     AMLODIPINE BESYLATE PO Take 5 mg by mouth daily       cyanocobalamin (VITAMIN  B-12) 1000 MCG tablet Take 1,000 mcg by mouth daily       VITAMIN D, CHOLECALCIFEROL, PO Take 2,000 Units by mouth daily       Citalopram Hydrobromide (CELEXA PO) Take 40 mg by mouth daily Patient takes  0.5 tabs by mouth       ROS:   Constitutional: No fever, chills, or sweats. No weight  gain/loss  ENT: No visual disturbance, ear ache, epistaxis, sore throat  Allergies/Immunologic: Negative  Respiratory: No cough, hemoptysis  Cardiovascular: As per HPI.   GI: No nausea, vomiting, hematemesis, melena, or hematochezia  : No urinary frequency, dysuria, or hematuria   Integument: No rash  Psychiatric: Negative  Neuro: No visual disturbance, weakness or paraesthesias  Endocrinology: Negative  Musculoskeletal: No myalgia    EXAM:  /86 (BP Location: Right arm)  Pulse 67  Resp 17  SpO2 100%  Breastfeeding? Jennifer Obrien is a 77 year old female in no apparent distress.  Physical Exam   Constitutional: She appears well-developed and well-nourished.   Eyes: Conjunctivae are normal.   Cardiovascular: Normal rate and regular rhythm.    Pulmonary/Chest: Effort normal and breath sounds normal.   Musculoskeletal: She exhibits no edema, tenderness or deformity.   Skin: Skin is warm and dry.   Bilaiteral pedal venous dermatostasis   Psychiatric: She has a normal mood and affect.   Vascular: Mild bilateral calf telangeictasia without clinically apparent varicosities.      Labs:   Lab Results   Component Value Date    WBC 7.0 08/21/2015     No results found for: INR   Lab Results   Component Value Date     08/21/2015        Imaging Results: Post-ablation ultrasound with no evidence of DVT. Limited bedside ultrasound demonstrates patent left SSV varicosity and noncompressible SSV. Patent right SSV with wall thickening and calcifications unchanged from venous incompetency study 8/23/2017.    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 Since ablation, she has noted improved symptoms of her left leg and unchanged symptoms on the right. Bedside US demonstrates a patent left SSV varicosity and patent right SSV with wall thickening and calcification.  -Left leg: Sclerotherapy to patent SSV varicosity  -Right leg:  Sclerotherapy to SSV  -Continue stockings daily  -IR will arrange for next treatment at patient's convenience    Dewey Orosco MD  Interventional Radiology  [facility]    I saw and examined the patient with the fellow and agree with assessment and plan.

## 2017-10-25 NOTE — LETTER
10/25/2017       RE: Camille Thompson  [address] [place] [address] [address] [facility] [address]     Dear Colleague,    Thank you for referring your patient, Camille Thompson, to the [facility] V[facility]ULAR [facility] at [facility]. Please see a copy of my visit note below.        Interventional Radiology Clinic Visit      Chief Complaint   Patient presents with     RECHECK     Follow up EVLA  to left leg      HPI: Ms. Thompson is a 77 year old female who presents to clinic for followup following left SSV laser ablation 8/23/2017. Since ablation, she has been doing well and is currently without complaint. She notes decreased bronzing of the skin of her left calf. She denies swelling or pain and has been wearing compression stockings daily.    PMH:   Past Medical History:   Diagnosis Date     Basal cell carcinoma        PSH:   Past Surgical History:   Procedure Laterality Date     BIOPSY OF SKIN LESION         Family History:   Family History   Problem Relation Age of Onset     Skin Cancer No family hx of      no family HX of skin cancer       Social History:   Social History   Substance Use Topics     Smoking status: Former Smoker     Smokeless tobacco: Never Used     Alcohol use Not on file     Medications:   Current Outpatient Prescriptions   Medication Sig Dispense Refill     OXYCODONE HCL PO Take 5 mg by mouth every 6 hours as needed       order for DME Please measure and distribute 1 pair of 20mmHg - 30mmHg thigh high open or closed toe compression stockings. Jobst ultrasheer or equivalent. 2 each 6     GABAPENTIN PO Take 300 mg by mouth daily 100 mg in am, 300 mg in PM       triamcinolone (KENALOG) 0.1 % cream Apply twice daily as needed to stasis dermatitis of lower legs (itchy, red skin). Never for face or groin. 80 g 2     furosemide (LASIX) 20 MG tablet Take 1 tablet (20 mg) by mouth 2 times daily 60 tablet 1     AMLODIPINE BESYLATE PO Take 5 mg by mouth daily       cyanocobalamin (VITAMIN  B-12) 1000 MCG  tablet Take 1,000 mcg by mouth daily       VITAMIN D, CHOLECALCIFEROL, PO Take 2,000 Units by mouth daily       Citalopram Hydrobromide (CELEXA PO) Take 40 mg by mouth daily Patient takes  0.5 tabs by mouth       ROS:   Constitutional: No fever, chills, or sweats. No weight gain/loss  ENT: No visual disturbance, ear ache, epistaxis, sore throat  Allergies/Immunologic: Negative  Respiratory: No cough, hemoptysis  Cardiovascular: As per HPI.   GI: No nausea, vomiting, hematemesis, melena, or hematochezia  : No urinary frequency, dysuria, or hematuria   Integument: No rash  Psychiatric: Negative  Neuro: No visual disturbance, weakness or paraesthesias  Endocrinology: Negative  Musculoskeletal: No myalgia    EXAM:  /86 (BP Location: Right arm)  Pulse 67  Resp 17  SpO2 100%  Breastfeeding? Jennifer Obrien is a 77 year old female in no apparent distress.  Physical Exam   Constitutional: She appears well-developed and well-nourished.   Eyes: Conjunctivae are normal.   Cardiovascular: Normal rate and regular rhythm.    Pulmonary/Chest: Effort normal and breath sounds normal.   Musculoskeletal: She exhibits no edema, tenderness or deformity.   Skin: Skin is warm and dry.   Bilaiteral pedal venous dermatostasis   Psychiatric: She has a normal mood and affect.   Vascular: Mild bilateral calf telangeictasia without clinically apparent varicosities.      Labs:   Lab Results   Component Value Date    WBC 7.0 08/21/2015     No results found for: INR   Lab Results   Component Value Date     08/21/2015        Imaging Results: Post-ablation ultrasound with no evidence of DVT. Limited bedside ultrasound demonstrates patent left SSV varicosity and noncompressible SSV. Patent right SSV with wall thickening and calcifications unchanged from venous incompetency study 8/23/2017.    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 Since ablation,  she has noted improved symptoms of her left leg and unchanged symptoms on the right. Bedside US demonstrates a patent left SSV varicosity and patent right SSV with wall thickening and calcification.  -Left leg: Sclerotherapy to patent SSV varicosity  -Right leg: Sclerotherapy to SSV  -Continue stockings daily  -IR will arrange for next treatment at patient's convenience    Dewey Orosco MD  Interventional Radiology  [facility]    I saw and examined the patient with the fellow and agree with assessment and plan.     Again, thank you for allowing me to participate in the care of your patient.      Sincerely,    Dewey Orosco MD

## 2017-10-25 NOTE — NURSING NOTE
Chief Complaint   Patient presents with     RECHECK     Follow up EVLA  to left leg        Vitals:    10/25/17 1351   BP: 147/86   BP Location: Right arm   Pulse: 67   Resp: 17   SpO2: 100%       There is no height or weight on file to calculate BMI.              Carolina Greenwood LPN

## 2017-10-30 DIAGNOSIS — I83.893 SYMPTOMATIC VARICOSE VEINS OF BOTH LOWER EXTREMITIES: Primary | ICD-10-CM

## 2017-11-02 ENCOUNTER — ONCOLOGY SURVIVORSHIP VISIT (OUTPATIENT)
Dept: ONCOLOGY | Facility: CLINIC | Age: 77
End: 2017-11-02
Attending: PHYSICIAN ASSISTANT
Payer: COMMERCIAL

## 2017-11-02 VITALS
HEIGHT: 70 IN | OXYGEN SATURATION: 97 % | DIASTOLIC BLOOD PRESSURE: 57 MMHG | TEMPERATURE: 97.7 F | SYSTOLIC BLOOD PRESSURE: 143 MMHG | BODY MASS INDEX: 22.56 KG/M2 | HEART RATE: 77 BPM | WEIGHT: 157.6 LBS

## 2017-11-02 DIAGNOSIS — C50.912 MALIGNANT NEOPLASM OF LEFT BREAST IN FEMALE, ESTROGEN RECEPTOR NEGATIVE, UNSPECIFIED SITE OF BREAST (H): Primary | ICD-10-CM

## 2017-11-02 DIAGNOSIS — Z17.1 MALIGNANT NEOPLASM OF LEFT BREAST IN FEMALE, ESTROGEN RECEPTOR NEGATIVE, UNSPECIFIED SITE OF BREAST (H): Primary | ICD-10-CM

## 2017-11-02 PROCEDURE — G0008 ADMIN INFLUENZA VIRUS VAC: HCPCS

## 2017-11-02 PROCEDURE — 99214 OFFICE O/P EST MOD 30 MIN: CPT | Mod: ZP | Performed by: PHYSICIAN ASSISTANT

## 2017-11-02 PROCEDURE — 90662 IIV NO PRSV INCREASED AG IM: CPT | Mod: ZF | Performed by: INTERNAL MEDICINE

## 2017-11-02 PROCEDURE — 25000128 H RX IP 250 OP 636: Mod: ZF | Performed by: INTERNAL MEDICINE

## 2017-11-02 PROCEDURE — 99212 OFFICE O/P EST SF 10 MIN: CPT | Mod: 25

## 2017-11-02 RX ADMIN — INFLUENZA A VIRUSA/MICHIGAN/45/2015 X-275 (H1N1) ANTIGEN (FORMALDEHYDE INACTIVATED), INFLUENZA A VIRUS A/HONG KONG/4801/2014 X-263B (H3N2) ANTIGEN (FORMALDEHYDE INACTIVATED), AND INFLUENZA B VIRUS B/BRISBANE/60/2008 ANTIGEN (FORMALDEHYDE INACTIVATED) 0.5 ML: 60; 60; 60 INJECTION, SUSPENSION INTRAMUSCULAR at 10:15

## 2017-11-02 ASSESSMENT — PAIN SCALES - GENERAL: PAINLEVEL: NO PAIN (0)

## 2017-11-02 NOTE — NURSING NOTE
Camille Thompson      1.  Has the patient received the information for the influenza vaccine? YES    2.  Does the patient have any of the following contraindications?     Allergy to eggs? No     Allergic reaction to previous influenza vaccines? No     Any other problems to previous influenza vaccines? No     Paralyzed by Guillain-[place] syndrome? No     Currently pregnant? NO     Current moderate or severe illness? No     Allergy to contact lens solution? No    3.  The vaccine has been administered in the usual fashion and the patient was instructed to wait 20 minutes before leaving the building in the event of an allergic reaction: YES    Vaccination given by Quintin Tapia MA  .  Recorded by Quintin Tapia      Flu injection given to patient in Right deltoid.  Patient tolerated well.     Quintin Tapia MA

## 2017-11-02 NOTE — MR AVS SNAPSHOT
After Visit Summary   11/2/2017    Camille Thompson    MRN: [id number]           Patient Information     Date Of Birth          1940        Visit Information        Provider Department      11/2/2017 9:30 AM Brenda Goodwin PA-C [facility]        Today's Diagnoses     Malignant neoplasm of left breast in female, estrogen receptor negative, unspecified site of breast (H)    -  1       Follow-ups after your visit        Your next 10 appointments already scheduled     Adrian 10, 2018  9:00 AM CST   IR VEIN SPIDER NON FACE SCLEROTHERAPY with [id number]   [facility], [place], Interventional Radiology ([facility], [facility])    [address] [facility] [address] [phone]           1. Your doctor will need to do a history and physical within 30 days before this procedure. 2. Your doctor will determine whether you need a 12 lead EKG. 3. Laboratory tests are to be obtained by your doctor prior to the exam (creatinine, hepatic panel, Hgb/Hct, platelet count, and INR) 4. Bring a list of all drugs you are taking; include supplements and over-the-counter medications. 5. Wear comfortable clothes and leave all valuables at home. 6. If you have allergies to x-ray contrast or iodine, contact your doctor or a Radiology nurse prior to the exam day for instructions. 7. If you are or may be pregnant, contact your doctor or a Radiology nurse prior to the day of the exam. 8. If you have diabetes, check with your doctor or a Radiology nurse to see if your insulin needs to be adjusted for the exam. 9. If you are taking Coumadin (to thin your blood) please contact your doctor or a Radiology nurse at least 5 days before the exam for special instructions. 10. You should not have received contrast within 48 hours of this exam. 11. The day before your exam you may eat your regular diet and are encouraged to drink at least 2 quarts of clear liquids. Drink no  alcoholic beverages for 24 hours prior to the exam. 12. If you have a colostomy you will need to irrigate it with tap water at 8PM the evening before and again at 6AM the morning of the exam. 13. Do not smoke for 24 hours prior to the procedure. 14. Do not eat any solid food or milk products for 6 hours prior to the exam. You may drink clear liquids until 2 hours prior to the exam. Clear liquids include the following: water, Jell-O, clear broth, apple juice or any noncarbonated drink that you can see through (no pop!) 15. The morning of the exam you may brush your teeth and take medications as directed with a sip of water. 16. Tell the Radiology nurse if you have any allergies. 17. You will be asked to empty your bladder before the exam begins. 18. Following the exam you will need to remain on complete bedrest for 4-6 hours. The nurse will monitor your vital signs, puncture site, and the pulses and temperature of the leg that was punctured.              Who to contact     If you have questions or need follow up information about today's clinic visit or your schedule please contact [facility] directly at [phone].  Normal or non-critical lab and imaging results will be communicated to you by [employer], letter or phone within 4 business days after the clinic has received the results. If you do not hear from us within 7 days, please contact the clinic through [employer] or phone. If you have a critical or abnormal lab result, we will notify you by phone as soon as possible.  Submit refill requests through [employer] or call your pharmacy and they will forward the refill request to us. Please allow 3 business days for your refill to be completed.          Additional Information About Your Visit        [employer] Information     [employer] gives you secure access to your electronic health record. If you see a primary care provider, you can also send messages to your care team and make appointments. If you have  "questions, please call your primary care clinic.  If you do not have a primary care provider, please call [phone] and they will assist you.        Care EveryWhere ID     This is your Care EveryWhere ID. This could be used by other organizations to access your [facility] medical records  [id number]        Your Vitals Were     Pulse Temperature Height Pulse Oximetry BMI (Body Mass Index)       77 97.7  F (36.5  C) (Oral) 1.78 m (5' 10.08\") 97% 22.56 kg/m2        Blood Pressure from Last 3 Encounters:   11/02/17 143/57   10/25/17 147/86   09/29/17 146/53    Weight from Last 3 Encounters:   11/02/17 71.5 kg (157 lb 9.6 oz)   05/19/17 74.7 kg (164 lb 9.6 oz)   01/16/17 74.2 kg (163 lb 8 oz)              Today, you had the following     No orders found for display       Primary Care Provider Office Phone # Fax #    Reanna S [facility] [phone]       [facility] [address]  [facility] [address]        Equal Access to Services     [facility] : Hadii aad ku hadasho Soomaali, waaxda luqadaha, qaybta kaalmada adeegyada, obdulia christensen . So [facility] [phone].    ATENCIÓN: Si habla español, tiene a nieves disposición servicios gratuitos de asistencia lingüística. Llame al [phone].    We comply with applicable federal civil rights laws and [place] laws. We do not discriminate on the basis of race, color, national origin, age, disability, sex, sexual orientation, or gender identity.            Thank you!     Thank you for choosing [facility]  for your care. Our goal is always to provide you with excellent care. Hearing back from our patients is one way we can continue to improve our services. Please take a few minutes to complete the written survey that you may receive in the mail after your visit with us. Thank you!             Your Updated Medication List - Protect others around you: Learn how to safely use, store and throw away your medicines " at www.disposemymeds.org.          This list is accurate as of: 11/2/17 11:59 PM.  Always use your most recent med list.                   Brand Name Dispense Instructions for use Diagnosis    AMLODIPINE BESYLATE PO      Take 5 mg by mouth daily        CELEXA PO      Take 40 mg by mouth daily Patient takes  0.5 tabs by mouth    Breast cancer, left (H)       cyanocobalamin 1000 MCG tablet    vitamin  B-12     Take 1,000 mcg by mouth daily        furosemide 20 MG tablet    LASIX    60 tablet    Take 1 tablet (20 mg) by mouth 2 times daily    Edema       GABAPENTIN PO      Take 300 mg by mouth daily 100 mg in am, 300 mg in PM        order for DME     2 each    Please measure and distribute 1 pair of 20mmHg - 30mmHg thigh high open or closed toe compression stockings. Jobst ultrasheer or equivalent.    Varicose veins of bilateral lower extremities with other complications       OXYCODONE HCL PO      Take 5 mg by mouth every 6 hours as needed        triamcinolone 0.1 % cream    KENALOG    80 g    Apply twice daily as needed to stasis dermatitis of lower legs (itchy, red skin). Never for face or groin.    Stasis dermatitis of both legs       VITAMIN D (CHOLECALCIFEROL) PO      Take 2,000 Units by mouth daily    Breast cancer, left (H)

## 2017-11-02 NOTE — LETTER
11/2/2017      RE: Camille Thompson  [address] [place] [address] [address] [facility] [address]       REASON FOR VISIT:  I am seeing Ms. Thompson in the Cancer Survivorship Program for her diagnosis of left breast cancer.      She had a screening mammogram on 01/07/2015 which showed a 1.2-cm mass in the left breast.  Ultrasound of the left breast on 01/08/2015 showed a hypoechoic mass measuring 1.3 x 1.0 x 0.9 cm.  Biopsy on 01/09/2015 showed poorly differentiated carcinoma.  ER and DC were negative.  HER2 was negative.  Breast MRI on 01/14/2015 showed an abnormal finding in the right breast.  Right breast ultrasound on 01/16/2015 showed an ill-defined, spiculated mass measuring 1.8 x 1.4 x 0.6 cm.  Ultrasound with biopsy showed an area of sclerosis.  She underwent bilateral mastectomy with bilateral sentinel lymph node biopsy on 01/29/2015.  The right side showed a complex sclerosing lesion, no cancer.  Zero out of 3 lymph nodes were positive.  The left breast showed infiltrating ductal carcinoma, Sara grade 3, measuring 1.1 cm.  Zero out of 5 lymph nodes were positive.  She had clear margins.  She was diagnosed with a stage I (T1 N0 M0) left breast cancer.  She began weekly Taxol and received 11 doses from 03/06/2015 to 05/15/2015.  The last Taxol dose was held secondary to lower extremity edema.  She completed 4 cycles of Adriamycin and Cytoxan from 06/05/2015 to 07/24/2015.  Total dosing of the Adriamycin was 240 mg/m2.      Ms. Thompson states that she is doing well at this time.  She is currently undergoing physical therapy for recent rotator cuff surgery on the right.  She is trying to walk, and I told her that her goal should be 150 minutes of cardiovascular exercise per week.  Overall, she has noticed improvement of her lower extremities.  She developed the edema with the Taxol.  She then developed hyperpigmentation in the lower extremities secondary to pooling of blood, the left leg greater than the right leg.  She had  vascular surgery a year ago but was unhappy with those results.  [facility] and she is happy with their care and results from the recent procedure.  She is otherwise eating and drinking okay.  She denies any chest pain, shortness of breath, cough, nausea, vomiting, abdominal pain, new bone pains, or headaches.     CANCER TREATMENT SUMMARY:  *Screening mammogram on 01/07/2015 which showed a 1.2-cm mass in the left breast.    *Ultrasound of the left breast on 01/08/2015 showed a hypoechoic mass measuring 1.3 x 1.0 x 0.9 cm.    *Biopsy on 01/09/2015 showed poorly differentiated carcinoma.  ER and MA were negative.  HER2 was negative.    *Breast MRI on 01/14/2015 showed an abnormal finding in the right breast.    *Right breast ultrasound on 01/16/2015 showed an ill-defined, spiculated mass measuring 1.8 x 1.4 x 0.6 cm.    *Ultrasound with biopsy showed an area of sclerosis.    *Bilateral mastectomy with bilateral sentinel lymph node biopsy on 01/29/2015.  The right side showed a complex sclerosing lesion, no cancer.  Zero out of 3 lymph nodes were positive.  The left breast showed infiltrating ductal carcinoma, [place] grade 3, measuring 1.1 cm.  Zero out of 5 lymph nodes were positive.  She had clear margins.   *Weekly Taxol and received 11 doses from 03/06/2015 to 05/15/2015.  The last Taxol dose was held secondary to lower extremity edema.  S  *4 cycles of Adriamycin and Cytoxan from 06/05/2015 to 07/24/2015.  Total dosing of the Adriamycin was 240 mg/m2.     MEDICATIONS:   Current Outpatient Prescriptions   Medication Sig Dispense Refill     order for DME Please measure and distribute 1 pair of 20mmHg - 30mmHg thigh high open or closed toe compression stockings. Jobst ultrasheer or equivalent. 2 each 6     GABAPENTIN PO Take 300 mg by mouth daily 100 mg in am, 300 mg in PM       triamcinolone (KENALOG) 0.1 % cream Apply twice daily as needed  "to stasis dermatitis of lower legs (itchy, red skin). Never for face or groin. 80 g 2     furosemide (LASIX) 20 MG tablet Take 1 tablet (20 mg) by mouth 2 times daily 60 tablet 1     AMLODIPINE BESYLATE PO Take 5 mg by mouth daily       cyanocobalamin (VITAMIN  B-12) 1000 MCG tablet Take 1,000 mcg by mouth daily       Citalopram Hydrobromide (CELEXA PO) Take 40 mg by mouth daily Patient takes  0.5 tabs by mouth       OXYCODONE HCL PO Take 5 mg by mouth every 6 hours as needed       VITAMIN D, CHOLECALCIFEROL, PO Take 2,000 Units by mouth daily         ALLERGIES:   Allergies   Allergen Reactions     Codeine Nausea     Penicillins Dermatitis and Swelling     FAMILY HISTORY:  Mother is  at age 97 with no health concerns.  Father is  at age 67 from esophagus cancer.  Cancer family history includes dad with esophagus cancer.     PHYSICAL EXAM:  Vitals: /57  Pulse 77  Temp 97.7  F (36.5  C) (Oral)  Ht 1.78 m (5' 10.08\")  Wt 71.5 kg (157 lb 9.6 oz)  SpO2 97%  BMI 22.56 kg/m2  GENERAL:  A pleasant person in no acute distress.   HEENT:  Sclerae are nonicteric.    NECK:  Supple.   LYMPH NODES:  No peripheral lymphadenopathy noted in the axillary, supraclavicular, or cervical regions.   LUNGS:  Clear to auscultation bilaterally.   HEART:  Regular rate and rhythm, with no murmur appreciated.   ABDOMEN:  Bowel sounds are active.  Soft and nontender.  No hepatosplenomegaly or other masses appreciated.  LOWER EXTREMITIES:  Without pitting edema to the knees bilaterally.   NEUROLOGICAL:  Alert/orientated/able to answer all questions.  CN grossly intact.  PSYCH:  Normal affect.  SKIN:  No suspicious lesions on areas of exposed skin.  CHEST WALL:  Bilateral breast reconstructions with well healed surgical incisions with implants intact.  No masses or nodules on anterior chest wall.    ASSESSMENT/PLAN:  I had the pleasure of seeing Ms. Thompson in the Cancer Survivorship Program for her left breast cancer.  " Given her diagnosis and treatment, I gave her the following recommendations.    1.  Stage I (T1 N0 M0) infiltrating ductal carcinoma; ER, DC and HER2/batsheva negative.  She was treated as above with surgery and chemotherapy.  Ms. Thompson continues to do well at this time.  She is not having any signs or symptoms that would suggest recurrence of her breast carcinoma.  She will continue to see the Oncology team every 6 months until she is out 5 years from her diagnosis.  After 5 years, she can decide whether she wants to be released to her primary care provider or continue to follow us on a yearly basis.  I would be happy to see her in the Survivorship Clinic.  Given her bilateral mastectomies, no routine breast imaging is warranted.  I will arrange for followup with Dr. Chavez in 6 months.   2.  Coping.  Overall, Ms. Thompson is coping well with her breast cancer diagnosis with no concerns.     3.  Energy level.  She states she is not back to baseline with regards to her energy level, but she is about 90% there.  She does state that she has had a decline in her energy with her recent surgeries of both her leg and her shoulder.  She was encouraged to continue her walking program and to incorporate 150 minutes of cardiovascular exercise per week.  She remains quite active and is retired from work.    4.  Peripheral neuropathy.  She developed this with the Taxol chemotherapy in her toes.  This consists of numbness.  This is unchanged since the Taxol chemotherapy.  She understands that this will be a permanent side effect from the chemotherapy.  She remains on the gabapentin which helps with the pain associated with the neuropathy.     5.  Lymphedema.  She has not noticed any lymphedema but will contact the clinic if present and we will refer her to the Lymphedema specialists.   6.  Cardiac toxicity.  She was exposed to Adriamycin which could have late effects of cardiomyopathy, left ventricular dysfunction, and arrhythmias.  I  suggest a baseline echocardiogram 5 years out from completion of chemotherapy, which would be in the summer of 2020.  We will do the echocardiogram sooner if she is having cardiac concerns.  She does not smoke.  She should have an exercise program of 150 minutes of cardiovascular exercise per week.  She should continue to see her primary care provider for screening and, if needed, treatment of her cholesterol, blood pressure and glucose.   7.  Cognitive changes.  She noted minor short-term memory difficulties since the chemotherapy, but they are not affecting her daily activity.  Should the cognitive changes worsen, we could consider neuropsychological testing and cognitive assessment in Cancer Rehab.   8.  Bladder.  Given her exposure to Cytoxan, she should report urinary urgency, urinary frequency, hematuria, or dysuria.  9.  Cancer screening.  She should continue to undergo routine screening for women of her age group.  She no longer gets Pap testing at the age of 77.  She does continue to get pelvic exams.  She understands that vaginal bleeding would be abnormal and would need to have this evaluated.  She had a colonoscopy about 2-3 years ago.  She should limit her sun exposure and use sunscreens.    10.  Healthy lifestyle.  She should refrain from tobacco.  Her diet should include low fats.  Her BMI should be between 20 and 25.  She should exercise with 150 minutes of cardiovascular exercise per week.  She should continue to see her primary care provider for screening and, if needed, treatment of her cholesterol, blood pressure and glucose.  She should receive the annual influenza vaccine which was given in clinic today.  I did recommend the pneumococcal vaccine, and she will discuss this with her primary care provider.  She should see her eye doctor and dentist routinely.       If there are no interval concerns, the patient will follow up in 6 months.      I spent 30 minutes with this patient, over 50% in  counseling.      CHRISTINE MartínezC

## 2017-11-02 NOTE — PROGRESS NOTES
REASON FOR VISIT:  I am seeing Ms. Thompson in the Cancer Survivorship Program for her diagnosis of left breast cancer.      She had a screening mammogram on 01/07/2015 which showed a 1.2-cm mass in the left breast.  Ultrasound of the left breast on 01/08/2015 showed a hypoechoic mass measuring 1.3 x 1.0 x 0.9 cm.  Biopsy on 01/09/2015 showed poorly differentiated carcinoma.  ER and CO were negative.  HER2 was negative.  Breast MRI on 01/14/2015 showed an abnormal finding in the right breast.  Right breast ultrasound on 01/16/2015 showed an ill-defined, spiculated mass measuring 1.8 x 1.4 x 0.6 cm.  Ultrasound with biopsy showed an area of sclerosis.  She underwent bilateral mastectomy with bilateral sentinel lymph node biopsy on 01/29/2015.  The right side showed a complex sclerosing lesion, no cancer.  Zero out of 3 lymph nodes were positive.  The left breast showed infiltrating ductal carcinoma, Sara grade 3, measuring 1.1 cm.  Zero out of 5 lymph nodes were positive.  She had clear margins.  She was diagnosed with a stage I (T1 N0 M0) left breast cancer.  She began weekly Taxol and received 11 doses from 03/06/2015 to 05/15/2015.  The last Taxol dose was held secondary to lower extremity edema.  She completed 4 cycles of Adriamycin and Cytoxan from 06/05/2015 to 07/24/2015.  Total dosing of the Adriamycin was 240 mg/m2.      Ms. Thompson states that she is doing well at this time.  She is currently undergoing physical therapy for recent rotator cuff surgery on the right.  She is trying to walk, and I told her that her goal should be 150 minutes of cardiovascular exercise per week.  Overall, she has noticed improvement of her lower extremities.  She developed the edema with the Taxol.  She then developed hyperpigmentation in the lower extremities secondary to pooling of blood, the left leg greater than the right leg.  She had vascular surgery a year ago but was unhappy with those results.  [facility] and she is happy with their care and results from the recent procedure.  She is otherwise eating and drinking okay.  She denies any chest pain, shortness of breath, cough, nausea, vomiting, abdominal pain, new bone pains, or headaches.     CANCER TREATMENT SUMMARY:  *Screening mammogram on 01/07/2015 which showed a 1.2-cm mass in the left breast.    *Ultrasound of the left breast on 01/08/2015 showed a hypoechoic mass measuring 1.3 x 1.0 x 0.9 cm.    *Biopsy on 01/09/2015 showed poorly differentiated carcinoma.  ER and [place] were negative.  HER2 was negative.    *Breast MRI on 01/14/2015 showed an abnormal finding in the right breast.    *Right breast ultrasound on 01/16/2015 showed an ill-defined, spiculated mass measuring 1.8 x 1.4 x 0.6 cm.    *Ultrasound with biopsy showed an area of sclerosis.    *Bilateral mastectomy with bilateral sentinel lymph node biopsy on 01/29/2015.  The right side showed a complex sclerosing lesion, no cancer.  Zero out of 3 lymph nodes were positive.  The left breast showed infiltrating ductal carcinoma, [place] grade 3, measuring 1.1 cm.  Zero out of 5 lymph nodes were positive.  She had clear margins.   *Weekly Taxol and received 11 doses from 03/06/2015 to 05/15/2015.  The last Taxol dose was held secondary to lower extremity edema.  S  *4 cycles of Adriamycin and Cytoxan from 06/05/2015 to 07/24/2015.  Total dosing of the Adriamycin was 240 mg/m2.     MEDICATIONS:   Current Outpatient Prescriptions   Medication Sig Dispense Refill     order for DME Please measure and distribute 1 pair of 20mmHg - 30mmHg thigh high open or closed toe compression stockings. Jobst ultrasheer or equivalent. 2 each 6     GABAPENTIN PO Take 300 mg by mouth daily 100 mg in am, 300 mg in PM       triamcinolone (KENALOG) 0.1 % cream Apply twice daily as needed to stasis dermatitis of lower legs (itchy, red skin). Never for face or groin. 80 g 2  "    furosemide (LASIX) 20 MG tablet Take 1 tablet (20 mg) by mouth 2 times daily 60 tablet 1     AMLODIPINE BESYLATE PO Take 5 mg by mouth daily       cyanocobalamin (VITAMIN  B-12) 1000 MCG tablet Take 1,000 mcg by mouth daily       Citalopram Hydrobromide (CELEXA PO) Take 40 mg by mouth daily Patient takes  0.5 tabs by mouth       OXYCODONE HCL PO Take 5 mg by mouth every 6 hours as needed       VITAMIN D, CHOLECALCIFEROL, PO Take 2,000 Units by mouth daily         ALLERGIES:   Allergies   Allergen Reactions     Codeine Nausea     Penicillins Dermatitis and Swelling     FAMILY HISTORY:  Mother is  at age 97 with no health concerns.  Father is  at age 67 from esophagus cancer.  Cancer family history includes dad with esophagus cancer.     PHYSICAL EXAM:  Vitals: /57  Pulse 77  Temp 97.7  F (36.5  C) (Oral)  Ht 1.78 m (5' 10.08\")  Wt 71.5 kg (157 lb 9.6 oz)  SpO2 97%  BMI 22.56 kg/m2  GENERAL:  A pleasant person in no acute distress.   HEENT:  Sclerae are nonicteric.    NECK:  Supple.   LYMPH NODES:  No peripheral lymphadenopathy noted in the axillary, supraclavicular, or cervical regions.   LUNGS:  Clear to auscultation bilaterally.   HEART:  Regular rate and rhythm, with no murmur appreciated.   ABDOMEN:  Bowel sounds are active.  Soft and nontender.  No hepatosplenomegaly or other masses appreciated.  LOWER EXTREMITIES:  Without pitting edema to the knees bilaterally.   NEUROLOGICAL:  Alert/orientated/able to answer all questions.  CN grossly intact.  PSYCH:  Normal affect.  SKIN:  No suspicious lesions on areas of exposed skin.  CHEST WALL:  Bilateral breast reconstructions with well healed surgical incisions with implants intact.  No masses or nodules on anterior chest wall.    ASSESSMENT/PLAN:  I had the pleasure of seeing Ms. Thompson in the Cancer Survivorship Program for her left breast cancer.  Given her diagnosis and treatment, I gave her the following recommendations.    1.  " Stage I (T1 N0 M0) infiltrating ductal carcinoma; ER, AL and HER2/batsheva negative.  She was treated as above with surgery and chemotherapy.  Ms. Thompson continues to do well at this time.  She is not having any signs or symptoms that would suggest recurrence of her breast carcinoma.  She will continue to see the Oncology team every 6 months until she is out 5 years from her diagnosis.  After 5 years, she can decide whether she wants to be released to her primary care provider or continue to follow us on a yearly basis.  I would be happy to see her in the Survivorship Clinic.  Given her bilateral mastectomies, no routine breast imaging is warranted.  I will arrange for followup with Dr. Chavez in 6 months.   2.  Coping.  Overall, Ms. Thompson is coping well with her breast cancer diagnosis with no concerns.     3.  Energy level.  She states she is not back to baseline with regards to her energy level, but she is about 90% there.  She does state that she has had a decline in her energy with her recent surgeries of both her leg and her shoulder.  She was encouraged to continue her walking program and to incorporate 150 minutes of cardiovascular exercise per week.  She remains quite active and is retired from work.    4.  Peripheral neuropathy.  She developed this with the Taxol chemotherapy in her toes.  This consists of numbness.  This is unchanged since the Taxol chemotherapy.  She understands that this will be a permanent side effect from the chemotherapy.  She remains on the gabapentin which helps with the pain associated with the neuropathy.     5.  Lymphedema.  She has not noticed any lymphedema but will contact the clinic if present and we will refer her to the Lymphedema specialists.   6.  Cardiac toxicity.  She was exposed to Adriamycin which could have late effects of cardiomyopathy, left ventricular dysfunction, and arrhythmias.  I suggest a baseline echocardiogram 5 years out from completion of chemotherapy, which  would be in the summer of 2020.  We will do the echocardiogram sooner if she is having cardiac concerns.  She does not smoke.  She should have an exercise program of 150 minutes of cardiovascular exercise per week.  She should continue to see her primary care provider for screening and, if needed, treatment of her cholesterol, blood pressure and glucose.   7.  Cognitive changes.  She noted minor short-term memory difficulties since the chemotherapy, but they are not affecting her daily activity.  Should the cognitive changes worsen, we could consider neuropsychological testing and cognitive assessment in Cancer Rehab.   8.  Bladder.  Given her exposure to Cytoxan, she should report urinary urgency, urinary frequency, hematuria, or dysuria.  9.  Cancer screening.  She should continue to undergo routine screening for women of her age group.  She no longer gets Pap testing at the age of 77.  She does continue to get pelvic exams.  She understands that vaginal bleeding would be abnormal and would need to have this evaluated.  She had a colonoscopy about 2-3 years ago.  She should limit her sun exposure and use sunscreens.    10.  Healthy lifestyle.  She should refrain from tobacco.  Her diet should include low fats.  Her BMI should be between 20 and 25.  She should exercise with 150 minutes of cardiovascular exercise per week.  She should continue to see her primary care provider for screening and, if needed, treatment of her cholesterol, blood pressure and glucose.  She should receive the annual influenza vaccine which was given in clinic today.  I did recommend the pneumococcal vaccine, and she will discuss this with her primary care provider.  She should see her eye doctor and dentist routinely.       If there are no interval concerns, the patient will follow up in 6 months.      I spent 30 minutes with this patient, over 50% in counseling.

## 2018-01-08 ENCOUNTER — TELEPHONE (OUTPATIENT)
Dept: INTERVENTIONAL RADIOLOGY/VASCULAR | Facility: CLINIC | Age: 78
End: 2018-01-08

## 2018-01-10 ENCOUNTER — APPOINTMENT (OUTPATIENT)
Dept: INTERVENTIONAL RADIOLOGY/VASCULAR | Facility: CLINIC | Age: 78
End: 2018-01-10
Attending: RADIOLOGY
Payer: MEDICARE

## 2018-01-10 ENCOUNTER — HOSPITAL ENCOUNTER (OUTPATIENT)
Facility: CLINIC | Age: 78
Discharge: HOME | End: 2018-01-10
Attending: RADIOLOGY | Admitting: RADIOLOGY
Payer: MEDICARE

## 2018-01-10 VITALS
HEART RATE: 72 BPM | SYSTOLIC BLOOD PRESSURE: 113 MMHG | RESPIRATION RATE: 16 BRPM | DIASTOLIC BLOOD PRESSURE: 53 MMHG | OXYGEN SATURATION: 97 %

## 2018-01-10 DIAGNOSIS — I83.893 SYMPTOMATIC VARICOSE VEINS OF BOTH LOWER EXTREMITIES: ICD-10-CM

## 2018-01-10 PROCEDURE — 36470 NJX SCLRSNT 1 INCMPTNT VEIN: CPT | Mod: 50

## 2018-01-10 PROCEDURE — 27210903 ZZH KIT CR5

## 2018-01-10 PROCEDURE — 25000125 ZZHC RX 250: Performed by: RADIOLOGY

## 2018-01-10 PROCEDURE — 27210732 ZZH ACCESSORY CR1

## 2018-01-10 RX ORDER — LIDOCAINE HYDROCHLORIDE 10 MG/ML
1-30 INJECTION, SOLUTION EPIDURAL; INFILTRATION; INTRACAUDAL; PERINEURAL
Status: COMPLETED | OUTPATIENT
Start: 2018-01-10 | End: 2018-01-10

## 2018-01-10 RX ADMIN — LIDOCAINE HYDROCHLORIDE 30 MG: 10 INJECTION, SOLUTION EPIDURAL; INFILTRATION; INTRACAUDAL; PERINEURAL at 10:04

## 2018-01-10 RX ADMIN — TETRADECYL HYDROGEN SULFATE (ESTER) 20 MG: 10 INJECTION, SOLUTION INTRAVENOUS at 10:04

## 2018-01-10 NOTE — PROGRESS NOTES
Interventional Radiology Intra-procedural Nursing Note    Patient Name: Camille Thompson  Medical Record Number: [id number]  Today's Date: January 10, 2018    Procedure: Sclerotherapy to both lower extremity veins    Attending MD in room during timeout:: Dr. Orosco  Proceduralist: Dr. Orosco    Sedation start time: No sedation    Procedure Start Time: 0945  Procedure End Time: 1005    Report given to: Discharged to home with patient education    D: Patient arrived to IR 0905  A: Patient was positioned prone, reverse trendelenberg and was monitored per protocol. Bilateral legs dressed and stockings on patient.l Patient tolerated the procedure without apparent incident.  P: Patient discharged to home. Patient provided with telephone contact information for questions, follow up appointments are in place. Response to post procedure education: patient verbalizes understanding.     Hien Acevedo RN

## 2018-01-11 ENCOUNTER — TELEPHONE (OUTPATIENT)
Dept: INTERVENTIONAL RADIOLOGY/VASCULAR | Facility: CLINIC | Age: 78
End: 2018-01-11

## 2018-01-11 DIAGNOSIS — I83.893 SYMPTOMATIC VARICOSE VEINS OF BOTH LOWER EXTREMITIES: Primary | ICD-10-CM

## 2018-01-12 NOTE — TELEPHONE ENCOUNTER
I called to see how pt was doing post sclerotherapy with Dr. Orosco and to get her scheduled for 3 week sclero check.  I left a message her  to have her call me Monday.   NAI Wells RN, BSN  Interventional Radiology Care Coordinator   Phone:  [phone]

## 2018-01-15 NOTE — TELEPHONE ENCOUNTER
Althea returned my call. She states she was a little sore after the sclerotherapy tx but is better today.  She is wearing her compression stockings.  I have her scheduled per her preference 1/31/18 @ 830 for 3 week sclero check with Dr. Orosco.  Pt confirms.  She can drive herself and check in 15 min prior to [facility] waiting room [facility].  NAI Wells RN, BSN  Interventional Radiology Care Coordinator   Phone:  [phone]

## 2018-01-29 ENCOUNTER — TELEPHONE (OUTPATIENT)
Dept: INTERVENTIONAL RADIOLOGY/VASCULAR | Facility: CLINIC | Age: 78
End: 2018-01-29

## 2018-01-29 NOTE — TELEPHONE ENCOUNTER
Patient called she has 4 half inch sized bumps she noticed in the last day and a half on her left leg where she recently had sclerotherapy on 1/10/18.  She states they are very tender to the touch.  She has been wearing compression stockings, denies redness or warmth.  Pt is scheduled for 3 week sclero check on Wed 1/31/18.  Dr. Ana Luisa lainez.  MARCY. Jamila Wells, RN, BSN  Interventional Radiology Care Coordinator   Phone:  [phone]

## 2018-01-31 ENCOUNTER — HOSPITAL ENCOUNTER (OUTPATIENT)
Facility: CLINIC | Age: 78
Discharge: HOME | End: 2018-01-31
Attending: RADIOLOGY | Admitting: RADIOLOGY
Payer: MEDICARE

## 2018-01-31 ENCOUNTER — APPOINTMENT (OUTPATIENT)
Dept: INTERVENTIONAL RADIOLOGY/VASCULAR | Facility: CLINIC | Age: 78
End: 2018-01-31
Attending: RADIOLOGY
Payer: MEDICARE

## 2018-01-31 VITALS
DIASTOLIC BLOOD PRESSURE: 85 MMHG | RESPIRATION RATE: 16 BRPM | OXYGEN SATURATION: 99 % | HEART RATE: 74 BPM | SYSTOLIC BLOOD PRESSURE: 155 MMHG

## 2018-01-31 DIAGNOSIS — I83.893 SYMPTOMATIC VARICOSE VEINS OF BOTH LOWER EXTREMITIES: ICD-10-CM

## 2018-01-31 PROCEDURE — 36471 NJX SCLRSNT MLT INCMPTNT VN: CPT

## 2018-01-31 PROCEDURE — 27210732 ZZH ACCESSORY CR1

## 2018-01-31 NOTE — PROGRESS NOTES
Interventional Radiology Intra-procedural Nursing Note    Patient Name: Camille Thompson  Medical Record Number: [id number]  Today's Date: January 31, 2018    Start Time: 0900  End of procedure time: 0910  Procedure: vein sclero check, blood trap release       Other Notes:     2 areas to LLE blood trap release. Dressings placed.     Jamila Mora

## 2018-03-19 ENCOUNTER — TELEPHONE (OUTPATIENT)
Dept: INTERVENTIONAL RADIOLOGY/VASCULAR | Facility: CLINIC | Age: 78
End: 2018-03-19

## 2018-03-19 NOTE — TELEPHONE ENCOUNTER
Althea called, she is wondering if she could come and be seen by Dr. Orosco.  She is s/p EVLA and sclerotherapy.  Last sclero check she had some areas of trapped blood that were expressed.  She states she has few areas that she thinks have trapped blood.  She describes as lumps along the vein path where sclero was injected, 1 lump she describes as being about an inch long and slightly discolored and tender.   Pt has not been wearing compression.  She states otherwise she is feeling well, no complaints with her legs.  I have messaged Dr. Orosco with update and will get pt scheduled accordingly.  NAI Wells, RN, BSN  Interventional Radiology Care Coordinator   Phone:  [phone]

## 2018-03-26 ENCOUNTER — OFFICE VISIT (OUTPATIENT)
Dept: RADIOLOGY | Facility: CLINIC | Age: 78
End: 2018-03-26
Payer: MEDICARE

## 2018-03-26 VITALS
SYSTOLIC BLOOD PRESSURE: 118 MMHG | RESPIRATION RATE: 16 BRPM | OXYGEN SATURATION: 97 % | DIASTOLIC BLOOD PRESSURE: 62 MMHG | HEART RATE: 95 BPM

## 2018-03-26 DIAGNOSIS — I87.2 VENOUS (PERIPHERAL) INSUFFICIENCY: Primary | ICD-10-CM

## 2018-03-26 ASSESSMENT — PAIN SCALES - GENERAL: PAINLEVEL: NO PAIN (0)

## 2018-03-26 NOTE — PROGRESS NOTES
"Patient self referred back to IR clinic with questions/concerns related to prior sclerotherapy treatment for symptomatic venous insufficiency. She is s/p foam sclerotherapy of the right SSV and varicose tributaries on 1/10/2018. She returned to IR for sclero check on 1/31/2018 at which time therapy was deemed complete.  Trapped coagulum release was performed at that time at a site of focal tenderness. She returns today with concerns related to ongoing generalized tenderness over the posterior right calf as well as \"palpable lumps\" under the skin.  She has had no fevers, localized redness or warmth, and no focal severe tenderness. No leg swelling.  She also asks about medial ankle bronzing and how long it will take to go away. No major changes to her medical history since her last IR visit.      /62 (BP Location: Right arm)  Pulse 95  Resp 16  SpO2 97%  Healthy appearing, NAD  Thin patient with thin legs. No LE edema, no bulging varicosities.  No skin wounds/lesions/rashes.   Hard palpable cords under the skin of the posterior/medial calf in the patients area of concern.  Mildly tender to touch.  Subtle hyperpigmentation is present over a few of these palpable abnormalities.     Bedside ultrasound was performed over the palpable abnormalities revealing expected changes of SSV and tributary sclerotherapy.  Palpable findings correspond to closed/thrombosed superficial veins.     A/P: Patient with expected changes and symptoms of RLE SSV and tributary sclerotherapy. No concerning features.  Patient was reassured of this.  Regarding medial calf/ankle bronzing, she was told that this may slowly improve with time, but will likely never return to baseline. All of questions were answered to her satisfaction. She will contact Dr. Nj clinic if any other concerns arise.     I spent a total of 15 minutes with this patient, of which greater than 50% was spent counseling.     "

## 2018-03-26 NOTE — NURSING NOTE
Chief Complaint   Patient presents with     RECHECK     Follow up sclerotherapy        Vitals:    03/26/18 0859   BP: 118/62   BP Location: Right arm   Pulse: 95   Resp: 16   SpO2: 97%       There is no height or weight on file to calculate BMI.               Carolina Greenwood LPN

## 2018-05-04 ENCOUNTER — TELEPHONE (OUTPATIENT)
Dept: INTERVENTIONAL RADIOLOGY/VASCULAR | Facility: CLINIC | Age: 78
End: 2018-05-04

## 2018-05-04 DIAGNOSIS — I83.893 SYMPTOMATIC VARICOSE VEINS OF BOTH LOWER EXTREMITIES: Primary | ICD-10-CM

## 2018-05-04 NOTE — TELEPHONE ENCOUNTER
Pt called she is having some lumpy and blue areas side of left inner knee and lumps on left calf.  She is approximately 6 months post EVLA Left SSV and sclerotherapy treatments.  I have her scheduled for 6 month f/up imaging and to see Dr. Orosco in clinic 5/16/18.  She is updated.  NAI Wells RN, BSN  Interventional Radiology Care Coordinator   Phone:  [phone]

## 2018-05-16 ENCOUNTER — OFFICE VISIT (OUTPATIENT)
Dept: RADIOLOGY | Facility: CLINIC | Age: 78
End: 2018-05-16
Payer: MEDICARE

## 2018-05-16 ENCOUNTER — RADIANT APPOINTMENT (OUTPATIENT)
Dept: ULTRASOUND IMAGING | Facility: CLINIC | Age: 78
End: 2018-05-16
Attending: RADIOLOGY
Payer: MEDICARE

## 2018-05-16 VITALS
HEART RATE: 78 BPM | SYSTOLIC BLOOD PRESSURE: 134 MMHG | RESPIRATION RATE: 16 BRPM | OXYGEN SATURATION: 100 % | DIASTOLIC BLOOD PRESSURE: 67 MMHG

## 2018-05-16 DIAGNOSIS — I83.893 VARICOSE VEINS OF BILATERAL LOWER EXTREMITIES WITH OTHER COMPLICATIONS: Primary | ICD-10-CM

## 2018-05-16 DIAGNOSIS — I83.893 SYMPTOMATIC VARICOSE VEINS OF BOTH LOWER EXTREMITIES: ICD-10-CM

## 2018-05-16 ASSESSMENT — PAIN SCALES - GENERAL: PAINLEVEL: MODERATE PAIN (5)

## 2018-05-16 NOTE — LETTER
5/16/2018       RE: Camille Thompson  [address] [place] [address] [address] [facility] [id number]     Dear Colleague,    Thank you for referring your patient, Camille Thompson, to the [facility] V[facility]ULAR [facility] at [facility]. Please see a copy of my visit note below.    S: Ms. Thompson returns to care approximately 4 months from last treatment of sclerotherapy of her right SSV and left leg tributary veins. She is doing well overall but reports some persistent area of tenderness in her left leg where she feels a palpable cord/treated vein. She doesn't currently wear stockings. She also had questions about her dermal bronzing in her ankle regions.     No other significant complaints currently.    O:  Gen: alert, nad  Legs: No edema. Dermal bronzing bilateral ankle and low calfs. In the area of tenderness in the left medial knee region and medial upper calf, there is a palpable cord corresponding to a non-compressible hypoechoic vein on ultrasound.     Imaging: I reviewed the ultrasound of the left leg which demonstrates non-compressibility of the left SSV and tributary branch off the GSV.    A/P: ~4 months s/p sclerotherapy right SSV and left leg tributary branches (and prior SSV ablation), doing well apart from noticing some tenderness and palpable thrombosed branches in the left leg. Reassured about findings, should resolve over time, many more months to fully resorb. 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 She understands this. RTC in 6 months.     Total of approximately 15 minutes spent with the patient of which >50% spent on counseling.       Again, thank you for allowing me to participate in the care of your patient.      Sincerely,    Dewey Orosco MD

## 2018-05-16 NOTE — PROGRESS NOTES
S: Ms. Thompson returns to care approximately 4 months from last treatment of sclerotherapy of her right SSV and left leg tributary veins. She is doing well overall but reports some persistent area of tenderness in her left leg where she feels a palpable cord/treated vein. She doesn't currently wear stockings. She also had questions about her dermal bronzing in her ankle regions.     No other significant complaints currently.    O:  Gen: alert, nad  Legs: No edema. Dermal bronzing bilateral ankle and low calfs. In the area of tenderness in the left medial knee region and medial upper calf, there is a palpable cord corresponding to a non-compressible hypoechoic vein on ultrasound.     Imaging: I reviewed the ultrasound of the left leg which demonstrates non-compressibility of the left SSV and tributary branch off the GSV.    A/P: ~4 months s/p sclerotherapy right SSV and left leg tributary branches (and prior SSV ablation), doing well apart from noticing some tenderness and palpable thrombosed branches in the left leg. Reassured about findings, should resolve over time, many more months to fully resorb. 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 She understands this. RTC in 6 months.     Total of approximately 15 minutes spent with the patient of which >50% spent on counseling.   Answers for HPI/ROS submitted by the patient on 5/11/2018   General Symptoms: No  Skin Symptoms: No  HENT Symptoms: No  EYE SYMPTOMS: No  HEART SYMPTOMS: No  LUNG SYMPTOMS: No  INTESTINAL SYMPTOMS: No  URINARY SYMPTOMS: No  GYNECOLOGIC SYMPTOMS: No  BREAST SYMPTOMS: No  SKELETAL SYMPTOMS: No  BLOOD SYMPTOMS: No  NERVOUS SYSTEM SYMPTOMS: No  MENTAL HEALTH SYMPTOMS: No

## 2018-05-16 NOTE — NURSING NOTE
Chief Complaint   Patient presents with     RECHECK     follow up varicose veins to left leg       Vitals:    05/16/18 1348   BP: 134/67   BP Location: Left arm   Pulse: 78   Resp: 16   SpO2: 100%       There is no height or weight on file to calculate BMI.           Carolina Greenwood LPN

## 2018-05-16 NOTE — MR AVS SNAPSHOT
After Visit Summary   5/16/2018    Camille Thompson    MRN: [id number]           Patient Information     Date Of Birth          1940        Visit Information        Provider Department      5/16/2018 2:00 PM Dewey Orosco MD [facility]        Today's Diagnoses     Varicose veins of bilateral lower extremities with other complications    -  1       Follow-ups after your visit        Your next 10 appointments already scheduled     May 18, 2018 10:00 AM CDT   (Arrive by 9:45 AM)   Return Visit with Nacho Chavez MD   [facility] ([facility])    [address] [place] [address]  [address]  [facility] [address] [phone]              Who to contact     Please call your clinic at [phone] to:    Ask questions about your health    Make or cancel appointments    Discuss your medicines    Learn about your test results    Speak to your doctor            Additional Information About Your Visit        MyChart Information     [employer] gives you secure access to your electronic health record. If you see a primary care provider, you can also send messages to your care team and make appointments. If you have questions, please call your primary care clinic.  If you do not have a primary care provider, please call [phone] and they will assist you.      [employer] is an electronic gateway that provides easy, online access to your medical records. With [employer], you can request a clinic appointment, read your test results, renew a prescription or communicate with your care team.     To access your existing account, please contact your [facility] or call [phone] for assistance.        Care EveryWhere ID     This is your Care EveryWhere ID. This could be used by other organizations to access your [facility] medical records  [id number]        Your Vitals Were     Pulse Respirations Pulse Oximetry Breastfeeding?          78  16 100% No         Blood Pressure from Last 3 Encounters:   05/16/18 134/67   03/26/18 118/62   01/31/18 155/85    Weight from Last 3 Encounters:   11/02/17 157 lb 9.6 oz   05/19/17 164 lb 9.6 oz   01/16/17 163 lb 8 oz              Today, you had the following     No orders found for display       Primary Care Provider Office Phone # Fax #    Reanna Bedolla [phone]       [facility] [address]  [place] [address]        Equal Access to Services     [facility]: Hadii aad ku hadasho Soomaali, waaxda luqadaha, qaybta kaalmada adeegyada, obdulia christensen . So [facility] [phone].    ATENCIÓN: Si habla español, tiene a nieves disposición servicios gratuitos de asistencia lingüística. [facility] [phone].    We comply with applicable federal civil rights laws and [place] laws. We do not discriminate on the basis of race, color, national origin, age, disability, sex, sexual orientation, or gender identity.            Thank you!     Thank you for choosing [facility]  for your care. Our goal is always to provide you with excellent care. Hearing back from our patients is one way we can continue to improve our services. Please take a few minutes to complete the written survey that you may receive in the mail after your visit with us. Thank you!             Your Updated Medication List - Protect others around you: Learn how to safely use, store and throw away your medicines at www.disposemymeds.org.          This list is accurate as of 5/16/18  4:10 PM.  Always use your most recent med list.                   Brand Name Dispense Instructions for use Diagnosis    AMLODIPINE BESYLATE PO      Take 5 mg by mouth daily        CELEXA PO      Take 40 mg by mouth daily Patient takes  0.5 tabs by mouth    Breast cancer, left (H)       cyanocobalamin 1000 MCG tablet    vitamin  B-12     Take 1,000 mcg by mouth daily        furosemide 20 MG tablet    LASIX    60  tablet    Take 1 tablet (20 mg) by mouth 2 times daily    Edema       GABAPENTIN PO      Take 300 mg by mouth daily 100 mg in am, 300 mg in PM        order for DME     2 each    Please measure and distribute 1 pair of 20mmHg - 30mmHg thigh high open or closed toe compression stockings. Jobst ultrasheer or equivalent.    Varicose veins of bilateral lower extremities with other complications       OXYCODONE HCL PO      Take 5 mg by mouth every 6 hours as needed        triamcinolone 0.1 % cream    KENALOG    80 g    Apply twice daily as needed to stasis dermatitis of lower legs (itchy, red skin). Never for face or groin.    Stasis dermatitis of both legs       VITAMIN D (CHOLECALCIFEROL) PO      Take 2,000 Units by mouth daily    Breast cancer, left (H)

## 2018-05-18 ENCOUNTER — ONCOLOGY VISIT (OUTPATIENT)
Dept: ONCOLOGY | Facility: CLINIC | Age: 78
End: 2018-05-18
Attending: INTERNAL MEDICINE
Payer: COMMERCIAL

## 2018-05-18 VITALS
TEMPERATURE: 97.1 F | HEIGHT: 70 IN | RESPIRATION RATE: 16 BRPM | SYSTOLIC BLOOD PRESSURE: 126 MMHG | DIASTOLIC BLOOD PRESSURE: 64 MMHG | HEART RATE: 92 BPM | OXYGEN SATURATION: 100 % | BODY MASS INDEX: 22.09 KG/M2 | WEIGHT: 154.3 LBS

## 2018-05-18 DIAGNOSIS — Z17.1 MALIGNANT NEOPLASM OF LEFT BREAST IN FEMALE, ESTROGEN RECEPTOR NEGATIVE, UNSPECIFIED SITE OF BREAST (H): Primary | ICD-10-CM

## 2018-05-18 DIAGNOSIS — C50.912 MALIGNANT NEOPLASM OF LEFT BREAST IN FEMALE, ESTROGEN RECEPTOR NEGATIVE, UNSPECIFIED SITE OF BREAST (H): Primary | ICD-10-CM

## 2018-05-18 DIAGNOSIS — G62.9 NEUROPATHY: ICD-10-CM

## 2018-05-18 PROCEDURE — G0463 HOSPITAL OUTPT CLINIC VISIT: HCPCS | Mod: ZF

## 2018-05-18 PROCEDURE — 99214 OFFICE O/P EST MOD 30 MIN: CPT | Mod: ZP | Performed by: INTERNAL MEDICINE

## 2018-05-18 RX ORDER — LISINOPRIL 10 MG/1
TABLET ORAL
COMMUNITY

## 2018-05-18 RX ORDER — HYDROCHLOROTHIAZIDE 12.5 MG/1
12.5 CAPSULE ORAL
COMMUNITY
Start: 2018-01-17 | End: 2019-06-03

## 2018-05-18 ASSESSMENT — PAIN SCALES - GENERAL: PAINLEVEL: NO PAIN (0)

## 2018-05-18 NOTE — PROGRESS NOTES
DIAGNOSIS:  Stage I (T1 N0 M0) infiltrating ductal carcinoma of the left breast, ER negative, NV negative, HER-2/batsheva negative. The patient is status post bilateral mastectomy on 01/29/2015. Pathology revealed an infiltrating ductal carcinoma, Sara grade 3. Tumor size was 1.1 cm. Margins were not involved. She had zero of 5 sentinel lymph nodes positive. The right breast showed no malignancy. The patient completed weekly Taxol x 11 from 03/06/2015 to 05/15/2015. Last dose of Taxol was held due to LE erythema.  She completed dose dense AC (adriamycin/cytoxan) x 4 cycles from 06/05/2015 to 07/24/2015.    INTERVAL HISTORY:  Ms. Thompson returns to the clinic today after about six months. She has done reasonably well.  She has recovered from her rotator cuff surgery.    She was seen by Dr. Velez and h ad some more vein sclerotherapy.  This has improved her edema.    She continues to be troubled by neuropathy.  She is somewhat unsteady on her feet and has to look at the floor when going up stairs.  It is not particularly painful.    No new symptoms otherwise.      ROS:  The rest of the review of systems is negative as below.    MEDICATIONS:   Current Outpatient Prescriptions   Medication Sig Dispense Refill     AMLODIPINE BESYLATE PO Take 5 mg by mouth daily       Citalopram Hydrobromide (CELEXA PO) Take 40 mg by mouth daily Patient takes  0.5 tabs by mouth       cyanocobalamin (VITAMIN  B-12) 1000 MCG tablet Take 1,000 mcg by mouth daily       furosemide (LASIX) 20 MG tablet Take 1 tablet (20 mg) by mouth 2 times daily 60 tablet 1     GABAPENTIN PO Take 300 mg by mouth daily 100 mg in am, 300 mg in PM       hydrochlorothiazide (MICROZIDE) 12.5 MG capsule Take 12.5 mg by mouth       lisinopril (PRINIVIL/ZESTRIL) 10 MG tablet        RIZATRIPTAN BENZOATE PO        VITAMIN D, CHOLECALCIFEROL, PO Take 2,000 Units by mouth daily       order for DME Please measure and distribute 1 pair of 20mmHg - 30mmHg thigh high open or  "closed toe compression stockings. Jobst ultrasheer or equivalent. (Patient not taking: Reported on 5/18/2018) 2 each 6         ALLERGIES:    Allergies   Allergen Reactions     Codeine Nausea     Penicillins Dermatitis and Swelling       PHYSICAL EXAMINATION:  Vitals: /64 (BP Location: Right arm, Patient Position: Chair, Cuff Size: Adult Regular)  Pulse 92  Temp 97.1  F (36.2  C) (Oral)  Resp 16  Ht 1.78 m (5' 10.08\")  Wt 70 kg (154 lb 4.8 oz)  SpO2 100%  BMI 22.09 kg/m2  GENERAL:  A pleasant person in no acute distress.    HEENT: NC/AT  BACK:  No tenderness  BREASTS:  Well healed implants in place with nipples.  No lsions  CV: RRR, no  Murmurs  PULM: CTAB  ABD: soft, NT/ND, no HSM  EXTREMITIES: Some chronic venous stasis changes in legs. They are less edematous  NEURO:  Grossly intact, we did not do extensive sensory testing    PROBLEMS:  1. Stage 1 TNBC s/p paclitaxel x 12 and AC x 4  2. Lower extremity edema secondary to incompetent valves due to taxol chemo?  3. 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 Thompson is doing well.  She is most bothered by her neuropathy.  She was at the recent survivorship conference and heard someone speak about vibration therapy.  I discussed this with Dr. Beatty in clinic today who suggested acupuncture as a possible way to handle this.  Will contact patient and make referral.    We will continue to follow her at 6 month intervals.      PLAN:  1.  RTC in 6 months to see Ms. Faith  2. Contact us sooner as necessary.  3. Will contact patient and see if she wants referral to acupuncture.    Nacho Chavez MD    Answers for HPI/ROS submitted by the patient on 5/11/2018   General Symptoms: No  Skin Symptoms: No  HENT Symptoms: No  EYE SYMPTOMS: No  HEART SYMPTOMS: No  LUNG SYMPTOMS: No  INTESTINAL SYMPTOMS: No  URINARY SYMPTOMS: No  GYNECOLOGIC SYMPTOMS: No  BREAST SYMPTOMS: No  SKELETAL SYMPTOMS: No  BLOOD SYMPTOMS: No  NERVOUS SYSTEM SYMPTOMS: No  MENTAL " HEALTH SYMPTOMS: No

## 2018-05-18 NOTE — MR AVS SNAPSHOT
After Visit Summary   5/18/2018    Camille Thompson    MRN: [id number]           Patient Information     Date Of Birth          1940        Visit Information        Provider Department      5/18/2018 10:00 AM Nacho Chavez MD [facility]        Today's Diagnoses     Malignant neoplasm of left breast in female, estrogen receptor negative, unspecified site of breast (H)    -  1    Neuropathy           Follow-ups after your visit        Additional Services     ACUPUNCTURE REFERRAL       Your provider has referred you to: [facility] acupuncture for taxane neuropathyy    Please be aware that coverage of these services is subject to the terms and limitations of your health insurance plan.  Call member services at your health plan with any benefit or coverage questions.      Please bring the following with you to your appointment:    (1) Any X-Rays, CTs or MRIs which have been performed.  Contact the facility where they were done to arrange for  prior to your scheduled appointment.  (2) List of current medications   (3) This referral request   (4) Any documents/labs given to you for this referral  Services Requested: Neuropathy treatment    Please answer the following questions:    1. How long have you been treating this patient for this pain issue?      3 years    2. Have there been any of the following problematic behaviors?        3. Has the patient been to any other pain clinics and/or programs?      No                  Follow-up notes from your care team     Return in about 6 months (around 11/18/2018) for Zainab.      Who to contact     If you have questions or need follow up information about today's clinic visit or your schedule please contact [facility] directly at [phone].  Normal or non-critical lab and imaging results will be communicated to you by MyChart, letter or phone within 4 business days after the clinic has received the results. If you do not  "hear from us within 7 days, please contact the clinic through [employer] or phone. If you have a critical or abnormal lab result, we will notify you by phone as soon as possible.  Submit refill requests through [employer] or call your pharmacy and they will forward the refill request to us. Please allow 3 business days for your refill to be completed.          Additional Information About Your Visit        [employer] Information     [employer] gives you secure access to your electronic health record. If you see a primary care provider, you can also send messages to your care team and make appointments. If you have questions, please call your primary care clinic.  If you do not have a primary care provider, please call [phone] and they will assist you.        Care EveryWhere ID     This is your Care EveryWhere ID. This could be used by other organizations to access your [facility] medical records  [id number]        Your Vitals Were     Pulse Temperature Respirations Height Pulse Oximetry BMI (Body Mass Index)    92 97.1  F (36.2  C) (Oral) 16 1.78 m (5' 10.08\") 100% 22.09 kg/m2       Blood Pressure from Last 3 Encounters:   05/18/18 126/64   05/16/18 134/67   03/26/18 118/62    Weight from Last 3 Encounters:   05/18/18 70 kg (154 lb 4.8 oz)   11/02/17 71.5 kg (157 lb 9.6 oz)   05/19/17 74.7 kg (164 lb 9.6 oz)              We Performed the Following     ACUPUNCTURE REFERRAL        Primary Care Provider Office Phone # Fax #    Reanna SUMMERS [facility] [phone]       [facility] [address] [facility] [address]        Equal Access to Services     [facility]: Hadii lacie Bergman, wakenzieda luernestina, qaybta kaalobdulia regalado. So [facility] [phone].    ATENCIÓN: Si habla español, tiene a nieves disposición servicios gratuitos de asistencia lingüística. Tracie al [phone].    We comply with applicable federal civil rights laws and [place] laws. We do " not discriminate on the basis of race, color, national origin, age, disability, sex, sexual orientation, or gender identity.            Thank you!     Thank you for choosing [facility]  for your care. Our goal is always to provide you with excellent care. Hearing back from our patients is one way we can continue to improve our services. Please take a few minutes to complete the written survey that you may receive in the mail after your visit with us. Thank you!             Your Updated Medication List - Protect others around you: Learn how to safely use, store and throw away your medicines at www.disposemymeds.org.          This list is accurate as of 5/18/18 11:30 AM.  Always use your most recent med list.                   Brand Name Dispense Instructions for use Diagnosis    AMLODIPINE BESYLATE PO      Take 5 mg by mouth daily        CELEXA PO      Take 40 mg by mouth daily Patient takes  0.5 tabs by mouth    Breast cancer, left (H)       cyanocobalamin 1000 MCG tablet    vitamin  B-12     Take 1,000 mcg by mouth daily        furosemide 20 MG tablet    LASIX    60 tablet    Take 1 tablet (20 mg) by mouth 2 times daily    Edema       GABAPENTIN PO      Take 300 mg by mouth daily 100 mg in am, 300 mg in PM        hydrochlorothiazide 12.5 MG capsule    MICROZIDE     Take 12.5 mg by mouth        lisinopril 10 MG tablet    PRINIVIL/ZESTRIL          order for DME     2 each    Please measure and distribute 1 pair of 20mmHg - 30mmHg thigh high open or closed toe compression stockings. Jobst ultrasheer or equivalent.    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

## 2018-05-18 NOTE — NURSING NOTE
"Oncology Rooming Note    May 18, 2018 9:58 AM   Camille Thompson is a 77 year old female who presents for:    Chief Complaint   Patient presents with     Oncology Clinic Visit     Return: Breast ca      Initial Vitals: /64 (BP Location: Right arm, Patient Position: Chair, Cuff Size: Adult Regular)  Pulse 92  Temp 97.1  F (36.2  C) (Oral)  Resp 16  Ht 1.78 m (5' 10.08\")  Wt 70 kg (154 lb 4.8 oz)  SpO2 100%  BMI 22.09 kg/m2 Estimated body mass index is 22.09 kg/(m^2) as calculated from the following:    Height as of this encounter: 1.78 m (5' 10.08\").    Weight as of this encounter: 70 kg (154 lb 4.8 oz). Body surface area is 1.86 meters squared.  No Pain (0) Comment: Data Unavailable   No LMP recorded. Patient is postmenopausal.  Allergies reviewed: YES  Medications reviewed: YES    Medications: Medication refills not needed today.  Pharmacy name entered into Quick[employer]:    [facility] - [place], [place] - [address] [id number] - [place], [place] - [address]     Clinical concerns: no new concerns.  Pt received flu shot elsewhere. See Immunizations     6 minutes for nursing intake (face to face time)     Jillian Dos Santos CMA                "

## 2018-05-18 NOTE — LETTER
5/18/2018       RE: Camille Thompson  [address] [place] [address] [address] [place] [address]     Dear Colleague,    Thank you for referring your patient, Camille Thompson, to the [facility]. Please see a copy of my visit note below.    DIAGNOSIS:  Stage I (T1 N0 M0) infiltrating ductal carcinoma of the left breast, ER negative, AK negative, HER-2/batsheva negative. The patient is status post bilateral mastectomy on 01/29/2015. Pathology revealed an infiltrating ductal carcinoma, Sara grade 3. Tumor size was 1.1 cm. Margins were not involved. She had zero of 5 sentinel lymph nodes positive. The right breast showed no malignancy. The patient completed weekly Taxol x 11 from 03/06/2015 to 05/15/2015. Last dose of Taxol was held due to LE erythema.  She completed dose dense AC (adriamycin/cytoxan) x 4 cycles from 06/05/2015 to 07/24/2015.    INTERVAL HISTORY:  Ms. Thompson returns to the clinic today after about six months. She has done reasonably well.  She has recovered from her rotator cuff surgery.    She was seen by Dr. Velez and h ad some more vein sclerotherapy.  This has improved her edema.    She continues to be troubled by neuropathy.  She is somewhat unsteady on her feet and has to look at the floor when going up stairs.  It is not particularly painful.    No new symptoms otherwise.      ROS:  The rest of the review of systems is negative as below.    MEDICATIONS:   Current Outpatient Prescriptions   Medication Sig Dispense Refill     AMLODIPINE BESYLATE PO Take 5 mg by mouth daily       Citalopram Hydrobromide (CELEXA PO) Take 40 mg by mouth daily Patient takes  0.5 tabs by mouth       cyanocobalamin (VITAMIN  B-12) 1000 MCG tablet Take 1,000 mcg by mouth daily       furosemide (LASIX) 20 MG tablet Take 1 tablet (20 mg) by mouth 2 times daily 60 tablet 1     GABAPENTIN PO Take 300 mg by mouth daily 100 mg in am, 300 mg in PM       hydrochlorothiazide (MICROZIDE) 12.5 MG capsule Take 12.5 mg by mouth        "lisinopril (PRINIVIL/ZESTRIL) 10 MG tablet        RIZATRIPTAN BENZOATE PO        VITAMIN D, CHOLECALCIFEROL, PO Take 2,000 Units by mouth daily       order for DME Please measure and distribute 1 pair of 20mmHg - 30mmHg thigh high open or closed toe compression stockings. Jobst ultrasheer or equivalent. (Patient not taking: Reported on 5/18/2018) 2 each 6         ALLERGIES:    Allergies   Allergen Reactions     Codeine Nausea     Penicillins Dermatitis and Swelling       PHYSICAL EXAMINATION:  Vitals: /64 (BP Location: Right arm, Patient Position: Chair, Cuff Size: Adult Regular)  Pulse 92  Temp 97.1  F (36.2  C) (Oral)  Resp 16  Ht 1.78 m (5' 10.08\")  Wt 70 kg (154 lb 4.8 oz)  SpO2 100%  BMI 22.09 kg/m2  GENERAL:  A pleasant person in no acute distress.    HEENT: NC/AT  BACK:  No tenderness  BREASTS:  Well healed implants in place with nipples.  No lsions  CV: RRR, no  Murmurs  PULM: CTAB  ABD: soft, NT/ND, no HSM  EXTREMITIES: Some chronic venous stasis changes in legs. They are less edematous  NEURO:  Grossly intact, we did not do extensive sensory testing    PROBLEMS:  1. Stage 1 TNBC s/p paclitaxel x 12 and AC x 4  2. Lower extremity edema secondary to incompetent valves due to taxol chemo?  3. 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 Thompson is doing well.  She is most bothered by her neuropathy.  She was at the recent survivorship conference and heard someone speak about vibration therapy.  I discussed this with Dr. Beatty in clinic today who suggested acupuncture as a possible way to handle this.  Will contact patient and make referral.    We will continue to follow her at 6 month intervals.      PLAN:  1.  RTC in 6 months to see Ms. Faith  2. Contact us sooner as necessary.  3. Will contact patient and see if she wants referral to acupuncture.    Nacho Chavez MD            "

## 2018-05-23 ENCOUNTER — TELEPHONE (OUTPATIENT)
Dept: OTHER | Facility: CLINIC | Age: 78
End: 2018-05-23

## 2018-05-23 NOTE — TELEPHONE ENCOUNTER
Acupuncture Benefits-  NO ACUPUNCTURE COVERAGE- PLAN EXCLUSION.I called the patient and advised her of the lack of coverage. She asked me to call and cancel the appt. [facility] and had [facility]t [facility].

## 2018-06-26 ENCOUNTER — TRANSFERRED RECORDS (OUTPATIENT)
Dept: HEALTH INFORMATION MANAGEMENT | Facility: CLINIC | Age: 78
End: 2018-06-26

## 2018-09-04 ENCOUNTER — ONCOLOGY VISIT (OUTPATIENT)
Dept: ONCOLOGY | Facility: CLINIC | Age: 78
End: 2018-09-04
Attending: PHYSICIAN ASSISTANT
Payer: MEDICARE

## 2018-09-04 VITALS
OXYGEN SATURATION: 98 % | SYSTOLIC BLOOD PRESSURE: 118 MMHG | WEIGHT: 151.9 LBS | HEART RATE: 67 BPM | HEIGHT: 70 IN | TEMPERATURE: 98 F | DIASTOLIC BLOOD PRESSURE: 67 MMHG | BODY MASS INDEX: 21.75 KG/M2

## 2018-09-04 DIAGNOSIS — Z17.1 MALIGNANT NEOPLASM OF LEFT BREAST IN FEMALE, ESTROGEN RECEPTOR NEGATIVE, UNSPECIFIED SITE OF BREAST (H): Primary | ICD-10-CM

## 2018-09-04 DIAGNOSIS — G62.0 DRUG-INDUCED POLYNEUROPATHY (H): ICD-10-CM

## 2018-09-04 DIAGNOSIS — C50.912 MALIGNANT NEOPLASM OF LEFT BREAST IN FEMALE, ESTROGEN RECEPTOR NEGATIVE, UNSPECIFIED SITE OF BREAST (H): Primary | ICD-10-CM

## 2018-09-04 PROCEDURE — G0463 HOSPITAL OUTPT CLINIC VISIT: HCPCS | Mod: ZF

## 2018-09-04 PROCEDURE — 99213 OFFICE O/P EST LOW 20 MIN: CPT | Mod: ZP | Performed by: PHYSICIAN ASSISTANT

## 2018-09-04 ASSESSMENT — PAIN SCALES - GENERAL: PAINLEVEL: NO PAIN (0)

## 2018-09-04 NOTE — MR AVS SNAPSHOT
After Visit Summary   9/4/2018    Camille Thompson    MRN: [id number]           Patient Information     Date Of Birth          1940        Visit Information        Provider Department      9/4/2018 8:40 AM Jacinta Faith PA-C [facility]        Today's Diagnoses     Malignant neoplasm of left breast in female, estrogen receptor negative, unspecified site of breast (H)    -  1    Drug-induced polyneuropathy (H)           Follow-ups after your visit        Additional Services     PHYSICAL THERAPY REFERRAL       Cancer Rehab-  Worsening thigh weakness and neuropathy in breast cancer patient                  Your next 10 appointments already scheduled     Sep 26, 2018  8:00 AM CDT   Cancer Rehab Treatment with Kenna Costa, PT   [facility] ([facility])    [address] [place] [address]  [place] [address]   [phone]            Oct 03, 2018 11:30 AM CDT   Cancer Rehab Treatment with Kenna Costa, PT   [facility] ([facility])    [address] [place] [address]   [phone]            Oct 10, 2018 11:30 AM CDT   Cancer Rehab Treatment with Kenna Costa, PT   [facility] [place] ([facility])    [address] [place] [address] [place] [address]   [phone]            Oct 17, 2018 11:30 AM CDT   Cancer Rehab Treatment with Kenna Julissa, PT   [facility] [place] ([facility])    [address] [place] [address] [place] [address]   [phone]            Oct 24, 2018  1:00 PM CDT   Cancer Rehab Treatment with Kenna Costa, PT   [facility] ([facility])    [address] [place] [address] [place] [address]   [phone]            Oct 31, 2018  9:00 AM CDT   Cancer Rehab Treatment with Kennamayela Costa, PT   [facility] [place] ([facility])    [facility] [address]   [phone]            Nov 21, 2018 11:40 AM CST   (Arrive by 11:25 AM)   Return Visit with Jacinta Faith PA-C   [facility] ([facility] [place])    [address]  [address]  [facility] [id number] [phone]            Dec 26, 2018  9:00 AM CST   (Arrive by 8:45 AM)   Return Neuropathy Visit with Prasanna York MD   [facility] ([facility])    [address]  3rd Floor  [facility] [id number] [phone]              Who to contact     If you have questions or need follow up information about today's clinic visit or your schedule please contact [facility] directly at [phone].  Normal or non-critical lab and imaging results will be communicated to you by MyChart, letter or phone within 4 business days after the clinic has received the results. If you do not hear from us within 7 days, please contact the clinic through [employer] or phone. If you have a critical or abnormal lab result, we will notify you by phone as soon as possible.  Submit refill requests through [employer] or call your pharmacy and they will forward the refill request to us. Please allow 3 business days for your refill to be completed.          Additional Information About Your Visit        MyChart Information     [employer] gives you secure access to your electronic health record. If you see a primary care provider, you can also send messages to your care team and make appointments. If you have questions, please call your primary care clinic.  If you do not have a primary care provider, please call [phone] and they will assist you.        Care EveryWhere ID     This is your Care EveryWhere ID. This could be used by other organizations to access your [place] medical records  [id number]        Your Vitals Were     Pulse Temperature Height Pulse Oximetry BMI (Body Mass Index)  "      67 98  F (36.7  C) (Tympanic) 1.78 m (5' 10.08\") 98% 21.75 kg/m2        Blood Pressure from Last 3 Encounters:   09/04/18 118/67   05/18/18 126/64   05/16/18 134/67    Weight from Last 3 Encounters:   09/04/18 68.9 kg (151 lb 14.4 oz)   05/18/18 70 kg (154 lb 4.8 oz)   11/02/17 71.5 kg (157 lb 9.6 oz)              We Performed the Following     PHYSICAL THERAPY REFERRAL        Primary Care Provider Office Phone # Fax #    Reanna SUMMERS [facility] [phone]       [facility] [address]  [facility] [address]        Equal Access to Services     SHAHANA MENDOZA : Hadii aad ku hadasho Soomaali, waaxda luqadaha, qaybta kaalmada adeegyada, obdulia quesadain haygabbie christensen . [facility] [phone].    ATENCIÓN: Si habla español, tiene a nieves disposición servicios gratuitos de asistencia lingüística. [facility] [phone].    We comply with applicable federal civil rights laws and [place] laws. We do not discriminate on the basis of race, color, national origin, age, disability, sex, sexual orientation, or gender identity.            Thank you!     Thank you for choosing [facility]  for your care. Our goal is always to provide you with excellent care. Hearing back from our patients is one way we can continue to improve our services. Please take a few minutes to complete the written survey that you may receive in the mail after your visit with us. Thank you!             Your Updated Medication List - Protect others around you: Learn how to safely use, store and throw away your medicines at www.disposemymeds.org.          This list is accurate as of 9/4/18 11:59 PM.  Always use your most recent med list.                   Brand Name Dispense Instructions for use Diagnosis    AMLODIPINE BESYLATE PO      Take 5 mg by mouth daily        CELEXA PO      Take 40 mg by mouth daily Patient takes  0.5 tabs by mouth    Breast cancer, left (H)       cyanocobalamin 1000 MCG tablet    " vitamin  B-12     Take 1,000 mcg by mouth daily        furosemide 20 MG tablet    LASIX    60 tablet    Take 1 tablet (20 mg) by mouth 2 times daily    Edema       GABAPENTIN PO      Take 300 mg by mouth daily 100 mg in am, 300 mg in PM        hydrochlorothiazide 12.5 MG capsule    MICROZIDE     Take 12.5 mg by mouth        lisinopril 10 MG tablet    PRINIVIL/ZESTRIL          order for DME     2 each    Please measure and distribute 1 pair of 20mmHg - 30mmHg thigh high open or closed toe compression stockings. Jobst ultrasheer or equivalent.    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

## 2018-09-04 NOTE — NURSING NOTE
"Oncology Rooming Note    September 4, 2018 8:28 AM   Camille Thompson is a 77 year old female who presents for:    Chief Complaint   Patient presents with     Oncology Clinic Visit     Return visit related to Breast Cancer     Initial Vitals: /67 (BP Location: Right arm, Patient Position: Sitting, Cuff Size: Adult Regular)  Pulse 67  Temp 98  F (36.7  C) (Tympanic)  Ht 1.78 m (5' 10.08\")  Wt 68.9 kg (151 lb 14.4 oz)  SpO2 98%  BMI 21.75 kg/m2 Estimated body mass index is 21.75 kg/(m^2) as calculated from the following:    Height as of this encounter: 1.78 m (5' 10.08\").    Weight as of this encounter: 68.9 kg (151 lb 14.4 oz). Body surface area is 1.85 meters squared.  No Pain (0) Comment: Data Unavailable   No LMP recorded. Patient is postmenopausal.  Allergies reviewed: Yes  Medications reviewed: Yes    Medications: Medication refills not needed today.  Pharmacy name entered into [facility]:    [facility] - [place], [place] - [address] [facility] - [place], [place] - [address]     Clinical concerns: No new concerns. Provider was notified.    10 minutes for nursing intake (face to face time)     Marylin Rivera LPN            "

## 2018-09-06 PROBLEM — C50.912 MALIGNANT NEOPLASM OF LEFT BREAST IN FEMALE, ESTROGEN RECEPTOR NEGATIVE, UNSPECIFIED SITE OF BREAST (H): Status: ACTIVE | Noted: 2018-09-06

## 2018-09-06 PROBLEM — G62.0 DRUG-INDUCED POLYNEUROPATHY (H): Status: ACTIVE | Noted: 2018-09-06

## 2018-09-06 PROBLEM — Z17.1 MALIGNANT NEOPLASM OF LEFT BREAST IN FEMALE, ESTROGEN RECEPTOR NEGATIVE, UNSPECIFIED SITE OF BREAST (H): Status: ACTIVE | Noted: 2018-09-06

## 2018-09-06 NOTE — PROGRESS NOTES
"[facility]ER CLINIC  FOLLOW-UP VISIT NOTE  Date of visit: Sep 4, 2018        REASON FOR VISIT: breast cancer, coming in to discuss neuropathy    HPI: Stage I (T1 N0 M0) infiltrating ductal carcinoma of the left breast, ER negative, ND negative, HER-2/batsheva negative. The patient is status post bilateral mastectomy on 01/29/2015. Pathology revealed an infiltrating ductal carcinoma, Sara grade 3. Tumor size was 1.1 cm. Margins were not involved. She had zero of 5 sentinel lymph nodes positive. The right breast showed no malignancy. The patient completed weekly Taxol x 11 from 03/06/2015 to 05/15/2015. Last dose of Taxol was held due to LE erythema.  She completed dose dense AC (adriamycin/cytoxan) x 4 cycles from 06/05/2015 to 07/24/2015.    INTERVAL HISTORY: Althea is three years out from her treatment for breast cancer.  Her biggest concern today is ongoing and actually worsening neuropathy.  She has had neuropathy in her fingertips and toes since taxane chemotherapy.  The last 9 months she feels its worsened.  She also has had a variety of small surgeries (rotator cuff, cataracts, vein sclerotherapy) and each time she feels she had to take 4 weeks off from exercising and never is really getting back into shape.  Her legs are weaker and she walks with a wide gait now.  She occasionally has to ask for a hand when walking on uneven ground.  She was in [place] for a few weeks and fell at the [employer] when her toe hit uneven ground.     Denies any new virus, no infectious complaints, no HA, vision issues, no new back pain, radicular pain, no problems swallowing or talking.  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     EXAM:  /67 (BP Location: Right arm, Patient Position: Sitting, Cuff Size: Adult Regular)  Pulse 67  Temp 98  F (36.7  C) (Tympanic)  Ht 1.78 m (5' 10.08\")  Wt 68.9 kg (151 lb 14.4 oz)  SpO2 98%  BMI 21.75 kg/m2  Wt " Readings from Last 4 Encounters:   09/04/18 68.9 kg (151 lb 14.4 oz)   05/18/18 70 kg (154 lb 4.8 oz)   11/02/17 71.5 kg (157 lb 9.6 oz)   05/19/17 74.7 kg (164 lb 9.6 oz)     Vital signs were reviewed.   Patient alert and oriented x3.   PERRLA. EOMI. No scleral icterus noted. OP without thrush/sores.  Neck exam: No palpable cervical, supraclavicular or axillary nodes bilaterally.   Heart: RRR no murmurs noted.   Lungs: clear to auscultation bilaterally.  No crackles or wheezing.   Abd: positive bowel sounds in all four quadrants.  No tenderness to palpation.  No hepatomegaly.   Extremities: No lower extremity edema.   Neuro: grossly intact. Strength equal upper and lower 5/5.  Foot exam- absolutely no sensation to toes or ball of foot.  Sensation intact everywhere else.  Reflexes 2+ at knees.  Mood and affect is stable.     LABS: none    ASSESSMENT/PLAN: Althea requested this visit today to discuss her neuropathy.  She thinks its become more noticeable in her feet.  She has a sensation of wearing socks all the time (although has sensation in her foot and ankle- to touch).  She feels her thighs are weaker this last year.  We discussed that it would be unusual for taxane induced neuropathy to worsen.  I am not sure if she perceives its worse because her legs are weaker or if it really is.  She feels with each of her surgeries this year, she has not just back to baseline with working out and regaining strength.  WE discussed going back to Cancer Rehab to work on lower leg strengthening.  We also discussed neuropathy treatments- some find acupuncture helpful and I have had other patients try ultrasound therapy as well.   We discussed drawing labs (TSH, B vitamins, heavy metals) and she declined today, she feels working out and trying some directed therapy to her feet would be the first step.  I also think its work returning to the Neurology team in the next few months if her interventions dont work.     Zainab Faith  Jomar

## 2018-09-18 ENCOUNTER — HOSPITAL ENCOUNTER (OUTPATIENT)
Dept: PHYSICAL THERAPY | Facility: CLINIC | Age: 78
End: 2018-09-18
Attending: PHYSICIAN ASSISTANT
Payer: MEDICARE

## 2018-09-18 DIAGNOSIS — M62.81 GENERALIZED MUSCLE WEAKNESS: ICD-10-CM

## 2018-09-18 DIAGNOSIS — Z17.1 MALIGNANT NEOPLASM OF LEFT BREAST IN FEMALE, ESTROGEN RECEPTOR NEGATIVE, UNSPECIFIED SITE OF BREAST (H): Primary | ICD-10-CM

## 2018-09-18 DIAGNOSIS — G62.0 DRUG-INDUCED POLYNEUROPATHY (H): ICD-10-CM

## 2018-09-18 DIAGNOSIS — C50.912 MALIGNANT NEOPLASM OF LEFT BREAST IN FEMALE, ESTROGEN RECEPTOR NEGATIVE, UNSPECIFIED SITE OF BREAST (H): Primary | ICD-10-CM

## 2018-09-18 DIAGNOSIS — R26.89 IMPAIRMENT OF BALANCE: ICD-10-CM

## 2018-09-18 PROCEDURE — G8982 BODY POS GOAL STATUS: HCPCS | Mod: CH | Performed by: PHYSICAL THERAPIST

## 2018-09-18 PROCEDURE — G8981 BODY POS CURRENT STATUS: HCPCS | Mod: CI | Performed by: PHYSICAL THERAPIST

## 2018-09-18 PROCEDURE — 40000360 ZZHC STATISTIC PT CANCER REHAB VISIT: Mod: GP | Performed by: PHYSICAL THERAPIST

## 2018-09-18 PROCEDURE — 97161 PT EVAL LOW COMPLEX 20 MIN: CPT | Mod: GP | Performed by: PHYSICAL THERAPIST

## 2018-09-18 NOTE — PROGRESS NOTES
09/18/18 0800   Quick Adds   Quick Adds Certification   Type of Visit Initial OP PT Evaluation   General Information   Start of Care Date 09/18/18   Referring Physician Jacinta Faith PA-C   Orders Evaluate and Treat as Indicated   Order Date 09/06/18   Medical Diagnosis left breast cancer; polyneuropathy   Onset of illness/injury or Date of Surgery 09/01/17   Special Instructions Cancer Rehab   Surgical/Medical history reviewed Yes   Pertinent history of current problem (include personal factors and/or comorbidities that impact the POC) Patient is a 77 y.o. female referred to physical therapy for cancer rehab due to worsening thigh weakness and neuropathy.  Cancer dx: stage I (T1 N0 M0) infiltrating ductal carcinoma of the left breast, ER negative, IL negative, HER-2/batsheva negative.  Patient s/p bilateral mastectomy on 1/29/2015 and completed weekly Taxol x 11 from 03/06/2015 to 05/15/201 and she completed dose dense AC (adriamycin/cytoxan) x 4 cycles from 06/05/2015 to 07/24/2015.  Pertinent PMH includes basal cell carcinoma and HTN.  Patient reports onset of neuropathy and venous insufficiency (left lower extremity) with chemo treatment.  Patient reporting increased neuropathy pain and difficulty with balance over the past year.  Patient reporting that she has had several surgeries the past year - cataracts, finger surgery in left hand, right rotator cuff, and lower extremity vein surgeries (left x 2 and right x 1); after each surgery she did not do much exercising.  Patient stating that she is very tentative while walking on uneven surfaces and descending stairs are difficult (reports that she is afraid of heights which contributes to some of her tentativeness regarding descending stairs).  Patient reports that she is unable to wear shoes right now because of blister on right big toe.  Patient report a few falls over the past year with the most recent one occurring while vacationing in [place] a month or so  "ago; toe caught and she fell - \"I hurt my hip (pointed to left hip).\"   Patient reporting that she is not at the level of activity she was prior to a year ago and is more tentative about certain things (stairs, uneven surfaces, climbing ladders); reports independence and ease with ADLs and IADLs.  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   Pertinent Visual History  wears glasses   Prior level of function comment Active and independent prior to breast cancer and after treatment; decline in balance and tolerance for mobility following multiple surgeries this past year.  Patient reporting that she was exercising on a more regular basis prior to a year ago.   Current Community Support Family/friend caregiver;Housekeeping service  ()   Patient role/Employment history Retired   Living environment [place]   Home/Community Accessibility Comments Lives in a 3 story home with her ; stairs present with no rails on steps leading into the home (just a few steps).   Assistive Devices Comments Does not use any assistive device or adaptive equipment.   Patient/Family Goals Statement \"neuropathy related strength and balance\"   Fall Risk Screen   Fall screen completed by PT   Have you fallen 2 or more times in the past year? Yes   Have you fallen and had an injury in the past year? Yes  (left hip bruising)   Timed Up and Go score (seconds) 7.43   Is patient a fall risk? Yes   Fall screen comments Patient demonstrating postural instability and has a history of falls; see below objective findings.   System Outcome Measures   FACIT Fatigue Subscale (score out of 52). The higher the score, the better the QOL. 49   Six Minute Walk (meters). An increase of 70 or more meters indicates statistically significant change. (Will assess at next visit)   Pain   Patient currently in pain No   Pain comments " "Patient denies any neuropathy pain (takes gabapentin); patient reporting diminished sensation in her feet and \"it feels like I am wearing socks and walking on my heels.\"   Cognitive Status Examination   Orientation orientation to person, place and time   Level of Consciousness alert   Follows Commands and Answers Questions 100% of the time   Personal Safety and Judgment intact   Memory intact   Integumentary   Integumentary No deficits were identified   Posture   Posture Normal   Range of Motion (ROM)   ROM Comment WNL AROM of bilateral upper and lower extremities   Strength   Strength Comments WNL bilateral lower extremities with the exception of right hip flexion 4+/5, left hip flexion 5-/5, and bilateral dorsiflexion 4/5   Bed Mobility   Bed Mobility Comments Reports independence   Transfer Skills   Transfer Comments Minimal use of bilateral upper extremities sit to/from stand   Gait   Gait Comments Patient able to ambulate around clinic space independently with no instability noted; patient demonstrating decreased dorsiflexion at heel strike bilateral.  Did not perform 6 MWT due to time (patient arrived late and equipment was being set-up in gym space and marked path was blocked).   Balance   Balance Comments Patient demonstrating fair to good static and dynamic balance; see below comments.   Balance Special Tests Single Leg Stance Right,   Right, seconds (see comments)   Comments Attempts, however, unable to hold position and arms out to side with hip and ankle strategies observed   Balance Special Tests Single Leg Stance Left   Left, seconds (see comments)   Comments Attempts, however, unable to hold position and arms out to side with hip and ankle strategies observed   Balance Special Tests Romberg   Seconds 30 Seconds  (at least)   Comments Able to obtain position independently and hold position with eyes opened and eyes closed for at least 30 seconds with increased postural instability noted with eyes " closed.   Balance Special Tests Sharpened Romberg   Seconds (see comments)   Comments Able to obtain position independently, however, unable to hold position for > 10 seconds; arms out and significant hip strategies observed.   Balance Special Tests Sit to Stand Reps in 30 Seconds   Reps in 30 seconds 12   Height 17 inches   Comments Minimal use of bilateral upper extremities to complete transfers; unable to perform without upper extremities.  No postural instability/loss of balance observed while patient performing.   Sensory Examination   Sensory Perception other (describe)   Sensory Perception Comments Diminished and impaired sensation (plantar aspect) bilateral feet   Coordination   Coordination no deficits were identified   Muscle Tone   Muscle Tone no deficits were identified   Planned Therapy Interventions   Planned Therapy Interventions balance training;gait training;neuromuscular re-education;ROM;strengthening;stretching;transfer training   Clinical Impression   Criteria for Skilled Therapeutic Interventions Met yes, treatment indicated   PT Diagnosis Impaired balance   Influenced by the following impairments Diminished and impaired sensation of bilateral feet, postural instability, decreased bilateral hip flexion, bilateral dorsiflexion and core strength, impaired static and dynamic balance, decreased dorsiflexion with heel strike bilateral   Functional limitations due to impairments Decreased tolerance and ease of ambulating on uneven surface, performing transfers without upper extremity support, and descending stairs; risk for falls   Clinical Presentation Stable/Uncomplicated   Clinical Presentation Rationale Based on current presentation, PMH, and social support.   Clinical Decision Making (Complexity) Low complexity   Therapy Frequency 1 time/week   Predicted Duration of Therapy Intervention (days/wks) 8 weeks   Risk & Benefits of therapy have been explained Yes   Patient, Family & other staff in  agreement with plan of care Yes   Education Assessment   Preferred Learning Style Listening;Demonstration;Pictures/video   Barriers to Learning No barriers   Goal 1   Goal Identifier HEP   Goal Description For independent long-term management of neuropathy and overall health and well-being, patient will be independent with a home exercise program addressing strength, balance, endurance/activity tolerance, and flexibility and report how to safely progress the program.   Target Date 11/27/18   Goal 2   Goal Identifier Postural instability   Goal Description The patient will be able to maintain sharpened rhomberg stance x 30 seconds while demonstrating a normal degree of sway to demonstrate improved postural stability.   Target Date 11/27/18   Goal 3   Goal Identifier Mobility   Goal Description Patient will report 25% improved confidence when walking on uneven surfaces and descending stairs in order improve tolerance and ease of community ambulation.   Target Date 11/27/18   Goal 4   Goal Identifier Sit to/from stand   Goal Description Patient will demonstrate increased tolerance and ease of transfers as well as improved bilateral lower extremity strength and endurance by completing at least 8 sit to/from stands in 30 seconds without upper extremity support.   Target Date 11/27/18   Total Evaluation Time   Total Evaluation Time (Minutes) 35     Thank you for this referral.

## 2018-09-18 NOTE — TELEPHONE ENCOUNTER
Discussed with patient. Appointment scheduled .   I called and spoke with Althea, She is doing well post EVLA left SSV.  She had a little pain at first few days but that's gone, she says her leg is a little itchy.  She has been wearing her compression stockings as recommended.  Pt is scheduled for US today for rule out DVT, she confirms this.  I will call her with results.  NAI Wells RN, BSN  Interventional Radiology Care Coordinator   Phone:  [phone]

## 2018-09-18 NOTE — PROGRESS NOTES
[facility]        OUTPATIENT PHYSICAL THERAPY FUNCTIONAL EVALUATION  PLAN OF TREATMENT FOR OUTPATIENT REHABILITATION  (COMPLETE FOR INITIAL CLAIMS ONLY)  Patient's Last Name, First Name, M.I.  YOB: 1940  Camille Thompson     Provider's Name   [facility]   Medical Record No.  [id number]     Start of Care Date:  09/18/18   Onset Date:  09/01/17   Type:     _X__PT   ____OT  ____SLP Medical Diagnosis:   Left breast cancer; polyneuropathy     PT Diagnosis:  Impaired balance Visits from SOC:  1                              __________________________________________________________________________________  Plan of Treatment/Functional Goals:  balance training, gait training, neuromuscular re-education, ROM, strengthening, stretching, transfer training       GOALS  HEP  For independent long-term management of neuropathy and overall health and well-being, patient will be independent with a home exercise program addressing strength, balance, endurance/activity tolerance, and flexibility and report how to safely progress the program.  11/27/18    Postural instability  The patient will be able to maintain sharpened rhomberg stance x 30 seconds while demonstrating a normal degree of sway to demonstrate improved postural stability.  11/27/18    Mobility  Patient will report 25% improved confidence when walking on uneven surfaces and descending stairs in order improve tolerance and ease of community ambulation.  11/27/18    Sit to/from stand  Patient will demonstrate increased tolerance and ease of transfers as well as improved bilateral lower extremity strength and endurance by completing at least 8 sit to/from stands in 30 seconds without upper extremity support.  11/27/18       Therapy Frequency:  1 time/week   Predicted Duration of Therapy Intervention:  8 weeks    Kenna  Julissa, PT                                    I CERTIFY THE NEED FOR THESE SERVICES FURNISHED UNDER        THIS PLAN OF TREATMENT AND WHILE UNDER MY CARE     (Physician co-signature of this document indicates review and certification of the therapy plan).                Certification Date From:   9/18/2018  Certification Date To:   11/27/2018    Referring Provider:  Jacinta Faith PA-C    Initial Assessment  See Epic Evaluation- Start of Care Date: 09/18/18

## 2018-09-26 ENCOUNTER — HOSPITAL ENCOUNTER (OUTPATIENT)
Dept: PHYSICAL THERAPY | Facility: CLINIC | Age: 78
End: 2018-09-26
Payer: MEDICARE

## 2018-09-26 DIAGNOSIS — Z17.1 MALIGNANT NEOPLASM OF LEFT BREAST IN FEMALE, ESTROGEN RECEPTOR NEGATIVE, UNSPECIFIED SITE OF BREAST (H): ICD-10-CM

## 2018-09-26 DIAGNOSIS — R26.89 IMPAIRMENT OF BALANCE: ICD-10-CM

## 2018-09-26 DIAGNOSIS — G62.0 DRUG-INDUCED POLYNEUROPATHY (H): ICD-10-CM

## 2018-09-26 DIAGNOSIS — M62.81 GENERALIZED MUSCLE WEAKNESS: Primary | ICD-10-CM

## 2018-09-26 DIAGNOSIS — C50.912 MALIGNANT NEOPLASM OF LEFT BREAST IN FEMALE, ESTROGEN RECEPTOR NEGATIVE, UNSPECIFIED SITE OF BREAST (H): ICD-10-CM

## 2018-09-26 PROCEDURE — 40000360 ZZHC STATISTIC PT CANCER REHAB VISIT: Mod: GP | Performed by: PHYSICAL THERAPIST

## 2018-09-26 PROCEDURE — 97110 THERAPEUTIC EXERCISES: CPT | Mod: GP | Performed by: PHYSICAL THERAPIST

## 2018-09-26 ASSESSMENT — 6 MINUTE WALK TEST (6MWT): TOTAL DISTANCE WALKED (METERS): 1540

## 2018-10-03 ENCOUNTER — HOSPITAL ENCOUNTER (OUTPATIENT)
Dept: PHYSICAL THERAPY | Facility: CLINIC | Age: 78
End: 2018-10-03
Payer: MEDICARE

## 2018-10-03 DIAGNOSIS — Z17.1 MALIGNANT NEOPLASM OF LEFT BREAST IN FEMALE, ESTROGEN RECEPTOR NEGATIVE, UNSPECIFIED SITE OF BREAST (H): ICD-10-CM

## 2018-10-03 DIAGNOSIS — M62.81 GENERALIZED MUSCLE WEAKNESS: Primary | ICD-10-CM

## 2018-10-03 DIAGNOSIS — R26.89 IMPAIRMENT OF BALANCE: ICD-10-CM

## 2018-10-03 DIAGNOSIS — C50.912 MALIGNANT NEOPLASM OF LEFT BREAST IN FEMALE, ESTROGEN RECEPTOR NEGATIVE, UNSPECIFIED SITE OF BREAST (H): ICD-10-CM

## 2018-10-03 DIAGNOSIS — G62.0 DRUG-INDUCED POLYNEUROPATHY (H): ICD-10-CM

## 2018-10-03 PROCEDURE — 97110 THERAPEUTIC EXERCISES: CPT | Mod: GP | Performed by: PHYSICAL THERAPIST

## 2018-10-03 PROCEDURE — 40000360 ZZHC STATISTIC PT CANCER REHAB VISIT: Mod: GP | Performed by: PHYSICAL THERAPIST

## 2018-10-03 PROCEDURE — 97112 NEUROMUSCULAR REEDUCATION: CPT | Mod: GP | Performed by: PHYSICAL THERAPIST

## 2018-10-10 ENCOUNTER — HOSPITAL ENCOUNTER (OUTPATIENT)
Dept: PHYSICAL THERAPY | Facility: CLINIC | Age: 78
End: 2018-10-10
Payer: MEDICARE

## 2018-10-10 DIAGNOSIS — R26.89 IMPAIRMENT OF BALANCE: Primary | ICD-10-CM

## 2018-10-10 DIAGNOSIS — Z17.1 MALIGNANT NEOPLASM OF LEFT BREAST IN FEMALE, ESTROGEN RECEPTOR NEGATIVE, UNSPECIFIED SITE OF BREAST (H): ICD-10-CM

## 2018-10-10 DIAGNOSIS — M62.81 GENERALIZED MUSCLE WEAKNESS: ICD-10-CM

## 2018-10-10 DIAGNOSIS — G62.0 DRUG-INDUCED POLYNEUROPATHY (H): ICD-10-CM

## 2018-10-10 DIAGNOSIS — C50.912 MALIGNANT NEOPLASM OF LEFT BREAST IN FEMALE, ESTROGEN RECEPTOR NEGATIVE, UNSPECIFIED SITE OF BREAST (H): ICD-10-CM

## 2018-10-10 PROCEDURE — 97110 THERAPEUTIC EXERCISES: CPT | Mod: GP | Performed by: PHYSICAL THERAPIST

## 2018-10-10 PROCEDURE — 40000360 ZZHC STATISTIC PT CANCER REHAB VISIT: Mod: GP | Performed by: PHYSICAL THERAPIST

## 2018-10-10 PROCEDURE — 97112 NEUROMUSCULAR REEDUCATION: CPT | Mod: GP | Performed by: PHYSICAL THERAPIST

## 2018-11-07 ENCOUNTER — HOSPITAL ENCOUNTER (OUTPATIENT)
Dept: PHYSICAL THERAPY | Facility: CLINIC | Age: 78
End: 2018-11-07
Payer: MEDICARE

## 2018-11-07 DIAGNOSIS — Z17.1 MALIGNANT NEOPLASM OF LEFT BREAST IN FEMALE, ESTROGEN RECEPTOR NEGATIVE, UNSPECIFIED SITE OF BREAST (H): ICD-10-CM

## 2018-11-07 DIAGNOSIS — M62.81 GENERALIZED MUSCLE WEAKNESS: Primary | ICD-10-CM

## 2018-11-07 DIAGNOSIS — G62.0 DRUG-INDUCED POLYNEUROPATHY (H): ICD-10-CM

## 2018-11-07 DIAGNOSIS — C50.912 MALIGNANT NEOPLASM OF LEFT BREAST IN FEMALE, ESTROGEN RECEPTOR NEGATIVE, UNSPECIFIED SITE OF BREAST (H): ICD-10-CM

## 2018-11-07 DIAGNOSIS — R26.89 IMPAIRMENT OF BALANCE: ICD-10-CM

## 2018-11-07 PROCEDURE — 97110 THERAPEUTIC EXERCISES: CPT | Mod: GP | Performed by: PHYSICAL THERAPIST

## 2018-11-07 PROCEDURE — 40000360 ZZHC STATISTIC PT CANCER REHAB VISIT: Mod: GP | Performed by: PHYSICAL THERAPIST

## 2018-11-07 PROCEDURE — 97112 NEUROMUSCULAR REEDUCATION: CPT | Mod: GP | Performed by: PHYSICAL THERAPIST

## 2018-11-30 ENCOUNTER — ONCOLOGY VISIT (OUTPATIENT)
Dept: ONCOLOGY | Facility: CLINIC | Age: 78
End: 2018-11-30
Attending: PHYSICIAN ASSISTANT
Payer: COMMERCIAL

## 2018-11-30 VITALS
HEART RATE: 68 BPM | WEIGHT: 142 LBS | TEMPERATURE: 97.9 F | OXYGEN SATURATION: 98 % | RESPIRATION RATE: 18 BRPM | DIASTOLIC BLOOD PRESSURE: 66 MMHG | SYSTOLIC BLOOD PRESSURE: 124 MMHG | BODY MASS INDEX: 20.33 KG/M2 | HEIGHT: 70 IN

## 2018-11-30 DIAGNOSIS — R19.7 DIARRHEA, UNSPECIFIED TYPE: ICD-10-CM

## 2018-11-30 DIAGNOSIS — C50.912 MALIGNANT NEOPLASM OF LEFT BREAST IN FEMALE, ESTROGEN RECEPTOR NEGATIVE, UNSPECIFIED SITE OF BREAST (H): Primary | ICD-10-CM

## 2018-11-30 DIAGNOSIS — Z17.1 MALIGNANT NEOPLASM OF LEFT BREAST IN FEMALE, ESTROGEN RECEPTOR NEGATIVE, UNSPECIFIED SITE OF BREAST (H): Primary | ICD-10-CM

## 2018-11-30 DIAGNOSIS — G62.9 NEUROPATHY: ICD-10-CM

## 2018-11-30 PROCEDURE — 99214 OFFICE O/P EST MOD 30 MIN: CPT | Mod: ZP | Performed by: PHYSICIAN ASSISTANT

## 2018-11-30 PROCEDURE — G0463 HOSPITAL OUTPT CLINIC VISIT: HCPCS | Mod: ZF

## 2018-11-30 ASSESSMENT — PAIN SCALES - GENERAL: PAINLEVEL: NO PAIN (0)

## 2018-11-30 NOTE — LETTER
"11/30/2018      RE: Camille Thompson  [address] [place] [address]   [place] [address]       [facility]  FOLLOW-UP VISIT NOTE  Date of visit: Nov 30, 2018        REASON FOR VISIT: breast cancer, coming in for routine 6 month follow up    HPI: Stage I (T1 N0 M0) infiltrating ductal carcinoma of the left breast, ER negative, UT negative, HER-2/batsheva negative. The patient is status post bilateral mastectomy on 01/29/2015. Pathology revealed an infiltrating ductal carcinoma, [place] grade 3. Tumor size was 1.1 cm. Margins were not involved. She had zero of 5 sentinel lymph nodes positive. The right breast showed no malignancy. The patient completed weekly Taxol x 11 from 03/06/2015 to 05/15/2015. Last dose of Taxol was held due to LE erythema.  She completed dose dense AC (adriamycin/cytoxan) x 4 cycles from 06/05/2015 to 07/24/2015.    INTERVAL HISTORY: Althea is three plus years out from her treatment for breast cancer.  She has been having quite a bit of diarrhea (6-12) a day.  Is doing a work up through [facility]- all infection has been negative and she is dropping off a second round today. Taking 1 imodium at night.  Feeling some light headedness in the AM.  Neuropathy still feels like its worsening.  She felt the PT I referred her too was great, but it was in the middle of elections and she hasn't been doing any of the work.  She is feeling weaker now.     Denies any new virus, no infectious complaints, no HA, vision issues, no new back pain, radicular pain, no problems swallowing or talking.  She lost the election for [employer] [facility] this November.      EXAM:  /66 (BP Location: Right arm, Patient Position: Sitting, Cuff Size: Adult Regular)  Pulse 68  Temp 97.9  F (36.6  C) (Oral)  Resp 18  Ht 1.78 m (5' 10.08\")  Wt 64.4 kg (142 lb)  SpO2 98%  BMI 20.33 kg/m2  Wt Readings from Last 4 Encounters:   11/30/18 64.4 kg (142 lb)   09/04/18 68.9 kg (151 lb 14.4 oz)   05/18/18 70 " kg (154 lb 4.8 oz)   11/02/17 71.5 kg (157 lb 9.6 oz)     Vital signs were reviewed.   Patient alert and oriented x3.   PERRLA. EOMI. No scleral icterus noted. OP without thrush/sores.  Neck exam: No palpable cervical, supraclavicular or axillary nodes bilaterally.   Heart: RRR no murmurs noted.   Lungs: clear to auscultation bilaterally.  No crackles or wheezing.   Abd: positive bowel sounds in all four quadrants.  No tenderness to palpation.  No hepatomegaly.   Extremities: No lower extremity edema.   Neuro: grossly intact. Mildly unstable gait.   Mood and affect is stable.     LABS: none    ASSESSMENT/PLAN:     #onc: h/o triple negative breast cancer, s/p mastectomy reconstruction and weekly Taxol x 12 + AC.  No concerning s/s for cancer recurrence.  She is over 3 years out from therapy which is reassuring.     #GI: diarrhea for a month, sounds like microscopic colitis. She is under a very thorough investigation through [facility].  She only likes taking imodium at night, has been taking 1 po at bedtime, I encouraged her to take 2.     #CV: BP good today, but she is feeling lightheaded in the AM.  Encouraged her to stop her hydrochlorothiazide while having diarrhea. Her lytes/creat looked fine when I viewed through [facility], however she runs the risk of being dehydrated until the diarrhea clears up.     #neuropathy: addi and I had a visit this fall about her neuropathy.  She felt PT really helped her weakness. However it was the middle of elections.  She would like to resume PT and try and get stronger again.  She feels we can cancel the neurology for now, however if her weakness/neuropathy worsens, I would like her to have it rescheduled.    Zainab Faith PA-C

## 2018-11-30 NOTE — NURSING NOTE
"Oncology Rooming Note    November 30, 2018 11:31 AM   Camille Thompson is a 78 year old female who presents for:    Chief Complaint   Patient presents with     Oncology Clinic Visit     Return visit related to Breast Cancer     Initial Vitals: /66 (BP Location: Right arm, Patient Position: Sitting, Cuff Size: Adult Regular)  Pulse 68  Temp 97.9  F (36.6  C) (Oral)  Resp 18  Ht 1.78 m (5' 10.08\")  Wt 64.4 kg (142 lb)  SpO2 98%  BMI 20.33 kg/m2 Estimated body mass index is 20.33 kg/(m^2) as calculated from the following:    Height as of this encounter: 1.78 m (5' 10.08\").    Weight as of this encounter: 64.4 kg (142 lb). Body surface area is 1.78 meters squared.  No Pain (0) Comment: Data Unavailable   No LMP recorded. Patient is postmenopausal.  Allergies reviewed: Yes  Medications reviewed: Yes    Medications: Medication refills not needed today.  Pharmacy name entered into [employer]:    [facility] - [place], [place] - [address] [facility] [address] - [place], [place] - [address]     Clinical concerns: No new concerns. Provider was notified.    10 minutes for nursing intake (face to face time)     Marylin Rivera LPN            "

## 2018-11-30 NOTE — MR AVS SNAPSHOT
After Visit Summary   11/30/2018    Camille Thompson    MRN: [id number]           Patient Information     Date Of Birth          1940        Visit Information        Provider Department      11/30/2018 11:40 AM Jacinta Faith PA-C [facility]        Today's Diagnoses     Malignant neoplasm of left breast in female, estrogen receptor negative, unspecified site of breast (H)    -  1    Neuropathy        Diarrhea, unspecified type           Follow-ups after your visit        Your next 10 appointments already scheduled     Dec 26, 2018  9:00 AM CST   (Arrive by 8:45 AM)   Return Neuropathy Visit with Prasanna York MD   [facility] Neurology ([facility])    [address] [facility] [address] [phone]              Who to contact     If you have questions or need follow up information about today's clinic visit or your schedule please contact [facility]CER [facility] directly at [phone].  Normal or non-critical lab and imaging results will be communicated to you by frentshart, letter or phone within 4 business days after the clinic has received the results. If you do not hear from us within 7 days, please contact the clinic through [facility] or phone. If you have a critical or abnormal lab result, we will notify you by phone as soon as possible.  Submit refill requests through [employer] or call your pharmacy and they will forward the refill request to us. Please allow 3 business days for your refill to be completed.          Additional Information About Your Visit        MyChart Information     [employer] gives you secure access to your electronic health record. If you see a primary care provider, you can also send messages to your care team and make appointments. If you have questions, please call your primary care clinic.  If you do not have a primary care provider, please call [phone] and they will assist you.       "  Care EveryWhere ID     This is your Care EveryWhere ID. This could be used by other organizations to access your [place] medical records  [id number]        Your Vitals Were     Pulse Temperature Respirations Height Pulse Oximetry BMI (Body Mass Index)    68 97.9  F (36.6  C) (Oral) 18 1.78 m (5' 10.08\") 98% 20.33 kg/m2       Blood Pressure from Last 3 Encounters:   11/30/18 124/66   09/04/18 118/67   05/18/18 126/64    Weight from Last 3 Encounters:   11/30/18 64.4 kg (142 lb)   09/04/18 68.9 kg (151 lb 14.4 oz)   05/18/18 70 kg (154 lb 4.8 oz)              Today, you had the following     No orders found for display       Primary Care Provider Office Phone # Fax #    Reanna SUMMERS [facility] [phone]       [facility] [address] [facility] [address]        Equal Access to Services     TIARRA MENDOZA : Hadii aad ku hadasho Soomaali, waaxda luqadaha, qaybta kaalmada adeegyada, waxay idiin hayaan luis f christensen . So [facility] [phone].    ATENCIÓN: Si habla español, tiene a nieves disposición servicios gratuitos de asistencia lingüística. Ll[facility] [phone].    We comply with applicable federal civil rights laws and [place] laws. We do not discriminate on the basis of race, color, national origin, age, disability, sex, sexual orientation, or gender identity.            Thank you!     Thank you for choosing [facility] [place]  for your care. Our goal is always to provide you with excellent care. Hearing back from our patients is one way we can continue to improve our services. Please take a few minutes to complete the written survey that you may receive in the mail after your visit with us. Thank you!             Your Updated Medication List - Protect others around you: Learn how to safely use, store and throw away your medicines at www.disposemymeds.org.          This list is accurate as of 11/30/18 12:41 PM.  Always use your most recent med list.                   Brand " Name Dispense Instructions for use Diagnosis    CELEXA PO      Take 40 mg by mouth daily Patient takes  0.5 tabs by mouth    Breast cancer, left (H)       GABAPENTIN PO      Take 300 mg by mouth daily 100 mg in am, 300 mg in PM        hydrochlorothiazide 12.5 MG capsule    MICROZIDE     Take 12.5 mg by mouth        lisinopril 10 MG tablet    PRINIVIL/ZESTRIL          order for DME     2 each    Please measure and distribute 1 pair of 20mmHg - 30mmHg thigh high open or closed toe compression stockings. Jobst ultrasheer or equivalent.    Varicose veins of bilateral lower extremities with other complications       RIZATRIPTAN BENZOATE PO           vitamin B-12 1000 MCG tablet    CYANOCOBALAMIN     Take 1,000 mcg by mouth daily        VITAMIN D (CHOLECALCIFEROL) PO      Take 2,000 Units by mouth daily    Breast cancer, left (H)

## 2018-11-30 NOTE — PROGRESS NOTES
"[facility]ER CLINIC  FOLLOW-UP VISIT NOTE  Date of visit: Nov 30, 2018        REASON FOR VISIT: breast cancer, coming in for routine 6 month follow up    HPI: Stage I (T1 N0 M0) infiltrating ductal carcinoma of the left breast, ER negative, [place] negative, HER-2/batsheva negative. The patient is status post bilateral mastectomy on 01/29/2015. Pathology revealed an infiltrating ductal carcinoma, [place] grade 3. Tumor size was 1.1 cm. Margins were not involved. She had zero of 5 sentinel lymph nodes positive. The right breast showed no malignancy. The patient completed weekly Taxol x 11 from 03/06/2015 to 05/15/2015. Last dose of Taxol was held due to LE erythema.  She completed dose dense AC (adriamycin/cytoxan) x 4 cycles from 06/05/2015 to 07/24/2015.    INTERVAL HISTORY: Althea is three plus years out from her treatment for breast cancer.  She has been having quite a bit of diarrhea (6-12) a day.  Is doing a work up through [facility]- all infection has been negative and she is dropping off a second round today. Taking 1 imodium at night.  Feeling some light headedness in the AM.  Neuropathy still feels like its worsening.  She felt the PT I referred her too was great, but it was in the middle of elections and she hasn't been doing any of the work.  She is feeling weaker now.     Denies any new virus, no infectious complaints, no HA, vision issues, no new back pain, radicular pain, no problems swallowing or talking.  She lost the election for [facility] this November.      EXAM:  /66 (BP Location: Right arm, Patient Position: Sitting, Cuff Size: Adult Regular)  Pulse 68  Temp 97.9  F (36.6  C) (Oral)  Resp 18  Ht 1.78 m (5' 10.08\")  Wt 64.4 kg (142 lb)  SpO2 98%  BMI 20.33 kg/m2  Wt Readings from Last 4 Encounters:   11/30/18 64.4 kg (142 lb)   09/04/18 68.9 kg (151 lb 14.4 oz)   05/18/18 70 kg (154 lb 4.8 oz)   11/02/17 71.5 kg (157 lb 9.6 oz)     Vital signs were " reviewed.   Patient alert and oriented x3.   PERRLA. EOMI. No scleral icterus noted. OP without thrush/sores.  Neck exam: No palpable cervical, supraclavicular or axillary nodes bilaterally.   Heart: RRR no murmurs noted.   Lungs: clear to auscultation bilaterally.  No crackles or wheezing.   Abd: positive bowel sounds in all four quadrants.  No tenderness to palpation.  No hepatomegaly.   Extremities: No lower extremity edema.   Neuro: grossly intact. Mildly unstable gait.   Mood and affect is stable.     LABS: none    ASSESSMENT/PLAN:     #onc: h/o triple negative breast cancer, s/p mastectomy reconstruction and weekly Taxol x 12 + AC.  No concerning s/s for cancer recurrence.  She is over 3 years out from therapy which is reassuring.     #GI: diarrhea for a month, sounds like microscopic colitis. She is under a very thorough investigation through [facility].  She only likes taking imodium at night, has been taking 1 po at bedtime, I encouraged her to take 2.     #CV: BP good today, but she is feeling lightheaded in the AM.  Encouraged her to stop her hydrochlorothiazide while having diarrhea. Her lytes/creat looked fine when I viewed through [facility], however she runs the risk of being dehydrated until the diarrhea clears up.     #neuropathy: addi and I had a visit this fall about her neuropathy.  She felt PT really helped her weakness. However it was the middle of elections.  She would like to resume PT and try and get stronger again.  She feels we can cancel the neurology for now, however if her weakness/neuropathy worsens, I would like her to have it rescheduled.    Zainab Faith PA-c

## 2019-01-28 NOTE — PROGRESS NOTES
Outpatient Physical Therapy Progress Note     Patient: Camille Thompson  : 1940    Beginning/End Dates of Reporting Period:  2018 to 2019; patient was seen for a total of 5 visits with last attended visit on 2018    Referring Provider: Jacinta Faith PA-C    Therapy Diagnosis: Impaired balance     Client Self Report: Patient reporting that she has not been able to perform the exercises as much as she would have liked because of running for [employer].  Now that the election is over patient will be able to perform the exercises; had been walking a lot (up to 5 miles a day) to [employer].  Patient also reporting diarrhea for the past 10 days; having testing done.  Patient wanting to review exercises today.    Objective Measurements: N/A    Outcome Measures (most recent score): N/A    Goals:  Goal Identifier HEP   Goal Description For independent long-term management of neuropathy and overall health and well-being, patient will be independent with a home exercise program addressing strength, balance, endurance/activity tolerance, and flexibility and report how to safely progress the program.   Target Date 18   Date Met      Progress:     Goal Identifier Postural instability   Goal Description The patient will be able to maintain sharpened rhomberg stance x 30 seconds while demonstrating a normal degree of sway to demonstrate improved postural stability.   Target Date 18   Date Met      Progress:     Goal Identifier Mobility   Goal Description Patient will report 25% improved confidence when walking on uneven surfaces and descending stairs in order improve tolerance and ease of community ambulation.   Target Date 18   Date Met      Progress:     Goal Identifier Sit to/from stand   Goal Description Patient will demonstrate increased tolerance and ease of transfers as well as improved bilateral lower extremity strength and endurance by completing at least 8 sit  to/from stands in 30 seconds without upper extremity support.   Target Date 11/27/18   Date Met      Progress:     Progress Toward Goals: Patient did not attend a scheduled visit for reassessment and has not scheduled further appointment.  Therefore, no formal assessment of progress towards above goals to report.   Patient was instructed in a HEP and required some cueing while performing the exercises at the last attended visit to maximize the performance of the exercises.    Plan:  Discharge from therapy.    Discharge:    Reason for Discharge: Patient has failed to schedule further appointments.    Equipment Issued: N/A    Discharge Plan: Patient to continue home program.

## 2019-01-28 NOTE — ADDENDUM NOTE
Encounter addended by: Kenna Ramires, PT on: 1/28/2019 3:06 PM   Actions taken: Sign clinical note, Episode edited, Episode resolved

## 2019-04-03 ENCOUNTER — OFFICE VISIT (OUTPATIENT)
Dept: VASCULAR SURGERY | Facility: CLINIC | Age: 79
End: 2019-04-03
Payer: MEDICARE

## 2019-04-03 VITALS — HEART RATE: 89 BPM | OXYGEN SATURATION: 96 % | SYSTOLIC BLOOD PRESSURE: 136 MMHG | DIASTOLIC BLOOD PRESSURE: 59 MMHG

## 2019-04-03 DIAGNOSIS — Z98.890 STATUS POST LASER ABLATION OF INCOMPETENT VEIN: Primary | ICD-10-CM

## 2019-04-03 ASSESSMENT — PAIN SCALES - GENERAL: PAINLEVEL: NO PAIN (0)

## 2019-04-03 ASSESSMENT — PATIENT HEALTH QUESTIONNAIRE - PHQ9: SUM OF ALL RESPONSES TO PHQ QUESTIONS 1-9: 3

## 2019-04-03 NOTE — LETTER
4/3/2019       RE: Camille Thompson  [address] [place] [address] [place] [address]     Dear Colleague,    Thank you for referring your patient, Camille Thompson, to the [facility]IC at [facility]. Please see a copy of my visit note below.    Ms. Thompson returns to care approximately 14 months after her last treatment for bilateral lower extremity superficial venous incompetency with complications of thrombophlebitis.  She returns with concerns about residual palpable firm veins in the area of treatment as well as overlying hemosiderin staining.    From s symptomatic standpoint, she is doing much better with near complete resolution of her prior symptoms related to her superficial venous incompetency.  In her left calf, she has several linear areas of dermal/hemosiderin staining.  Ultrasound was performed of these areas confirming that these correspond to areas of treated veins.  The veins themselves are very atretic and barely visible.  No edema.  No visible or palpable large varicose veins.  No other complaints.    /59 (BP Location: Right arm, Patient Position: Chair, Cuff Size: Adult Regular)   Pulse 89   SpO2 96%   Lower extremities:  Left leg: Several linear areas of hemosiderin staining.  Focal area of firmness in the upper posterior mid calf corresponds to either an atretic vein or possibly a small lipoma.  No ulceration.  Hemosiderin staining along the low medial ankle is chronic.  No pitting edema.  Right leg: Small amount of hemosiderin staining medial ankle.  No ulceration.  +2 DP/PT 8.    Labs: None ordered.    Imaging: None ordered.    A/P: 78-year-old status post endovenous laser ablation with adjunctive sclerotherapy to treat bilateral lower extremity superficial venous incompetency.  Doing well from a symptomatic standpoint, symptoms are completely resolved.  However, she has some cosmetic concerns regarding residual hemosiderin staining overlying to the  veins of the left calf.  The patient was reassured and consulted that the hemosiderin staining will likely slowly dissipate over time, however may not completely resolve.  Although not ideal, she is satisfied with the treatment despite this cosmetic sequela.  Patient will return to care as necessary.    Total of approximately 15 minutes spent with the patient of which greater than 50% spent on counseling.    Again, thank you for allowing me to participate in the care of your patient.      Sincerely,    Dewey Orosco MD

## 2019-04-03 NOTE — NURSING NOTE
Vascular Rooming Note     Camille Thompson's goals for this visit include:   Chief Complaint   Patient presents with     RECHDARYN Oh, is being seen today for a follow up, after procedure visible lines on left leg, no pain, no other concerns at this time, as reported by patient.     Bonnie Maldonado LPN

## 2019-04-04 NOTE — PROGRESS NOTES
Ms. Thompson returns to care approximately 14 months after her last treatment for bilateral lower extremity superficial venous incompetency with complications of thrombophlebitis.  She returns with concerns about residual palpable firm veins in the area of treatment as well as overlying hemosiderin staining.    From s symptomatic standpoint, she is doing much better with near complete resolution of her prior symptoms related to her superficial venous incompetency.  In her left calf, she has several linear areas of dermal/hemosiderin staining.  Ultrasound was performed of these areas confirming that these correspond to areas of treated veins.  The veins themselves are very atretic and barely visible.  No edema.  No visible or palpable large varicose veins.  No other complaints.    /59 (BP Location: Right arm, Patient Position: Chair, Cuff Size: Adult Regular)   Pulse 89   SpO2 96%   Lower extremities:  Left leg: Several linear areas of hemosiderin staining.  Focal area of firmness in the upper posterior mid calf corresponds to either an atretic vein or possibly a small lipoma.  No ulceration.  Hemosiderin staining along the low medial ankle is chronic.  No pitting edema.  Right leg: Small amount of hemosiderin staining medial ankle.  No ulceration.  +2 DP/PT 8.    Labs: None ordered.    Imaging: None ordered.    A/P: 78-year-old status post endovenous laser ablation with adjunctive sclerotherapy to treat bilateral lower extremity superficial venous incompetency.  Doing well from a symptomatic standpoint, symptoms are completely resolved.  However, she has some cosmetic concerns regarding residual hemosiderin staining overlying to the veins of the left calf.  The patient was reassured and consulted that the hemosiderin staining will likely slowly dissipate over time, however may not completely resolve.  Although not ideal, she is satisfied with the treatment despite this cosmetic sequela.  Patient will return to  care as necessary.    Total of approximately 15 minutes spent with the patient of which greater than 50% spent on counseling.  Answers for HPI/ROS submitted by the patient on 4/3/2019   General Symptoms: No  Skin Symptoms: No  HENT Symptoms: No  EYE SYMPTOMS: No  HEART SYMPTOMS: No  LUNG SYMPTOMS: No  INTESTINAL SYMPTOMS: No  URINARY SYMPTOMS: No  GYNECOLOGIC SYMPTOMS: No  BREAST SYMPTOMS: No  SKELETAL SYMPTOMS: No  BLOOD SYMPTOMS: No  NERVOUS SYSTEM SYMPTOMS: No  MENTAL HEALTH SYMPTOMS: No

## 2019-06-03 ENCOUNTER — ONCOLOGY VISIT (OUTPATIENT)
Dept: ONCOLOGY | Facility: CLINIC | Age: 79
End: 2019-06-03
Attending: PHYSICIAN ASSISTANT
Payer: MEDICARE

## 2019-06-03 VITALS
SYSTOLIC BLOOD PRESSURE: 138 MMHG | OXYGEN SATURATION: 100 % | RESPIRATION RATE: 18 BRPM | TEMPERATURE: 97.9 F | BODY MASS INDEX: 21.47 KG/M2 | DIASTOLIC BLOOD PRESSURE: 63 MMHG | HEIGHT: 70 IN | WEIGHT: 150 LBS | HEART RATE: 78 BPM

## 2019-06-03 DIAGNOSIS — Z78.0 POST-MENOPAUSAL: Primary | ICD-10-CM

## 2019-06-03 DIAGNOSIS — M85.80 OSTEOPENIA, UNSPECIFIED LOCATION: ICD-10-CM

## 2019-06-03 PROCEDURE — G0463 HOSPITAL OUTPT CLINIC VISIT: HCPCS | Mod: ZF

## 2019-06-03 PROCEDURE — 99214 OFFICE O/P EST MOD 30 MIN: CPT | Mod: ZP | Performed by: PHYSICIAN ASSISTANT

## 2019-06-03 RX ORDER — FLUOCINONIDE TOPICAL SOLUTION USP, 0.05% 0.5 MG/ML
SOLUTION TOPICAL
Refills: 2 | COMMUNITY
Start: 2019-05-20

## 2019-06-03 ASSESSMENT — PAIN SCALES - GENERAL: PAINLEVEL: NO PAIN (0)

## 2019-06-03 ASSESSMENT — MIFFLIN-ST. JEOR: SCORE: 1251.9

## 2019-06-03 NOTE — NURSING NOTE
"Oncology Rooming Note    Shahnaz 3, 2019 9:12 AM   Camille Thompson is a 78 year old female who presents for:    Chief Complaint   Patient presents with     Oncology Clinic Visit     Return visit related to Breast Cancer     Initial Vitals: /63 (BP Location: Right arm, Patient Position: Sitting, Cuff Size: Adult Regular)   Pulse 78   Temp 97.9  F (36.6  C) (Oral)   Resp 18   Ht 1.78 m (5' 10.08\")   Wt 68 kg (150 lb)   SpO2 100%   BMI 21.47 kg/m   Estimated body mass index is 21.47 kg/m  as calculated from the following:    Height as of this encounter: 1.78 m (5' 10.08\").    Weight as of this encounter: 68 kg (150 lb). Body surface area is 1.83 meters squared.  No Pain (0) Comment: Data Unavailable   No LMP recorded. Patient is postmenopausal.  Allergies reviewed: Yes  Medications reviewed: Yes    Medications: Medication refills not needed today.  Pharmacy name entered into [employer]:    [facility] - [place], [place] - [address] [id number] - [place], [place] - [address]     Clinical concerns: No new concerns. Provider was notified.      Marylin Rivera LPN            "

## 2019-09-29 ENCOUNTER — HEALTH MAINTENANCE LETTER (OUTPATIENT)
Age: 79
End: 2019-09-29

## 2019-11-10 NOTE — PROGRESS NOTES
"[facility] CANCER CLINIC  FOLLOW-UP VISIT NOTE  Date of visit: Cash 3, 2019        REASON FOR VISIT: breast cancer, coming in for routine 6 month follow up    HPI: Stage I (T1 N0 M0) infiltrating ductal carcinoma of the left breast, ER negative, [place] negative, HER-2/batsheva negative. The patient is status post bilateral mastectomy on 01/29/2015. Pathology revealed an infiltrating ductal carcinoma, [place] grade 3. Tumor size was 1.1 cm. Margins were not involved. She had zero of 5 sentinel lymph nodes positive. The right breast showed no malignancy. The patient completed weekly Taxol x 11 from 03/06/2015 to 05/15/2015. Last dose of Taxol was held due to LE erythema.  She completed dose dense AC (adriamycin/cytoxan) x 4 cycles from 06/05/2015 to 07/24/2015.    INTERVAL HISTORY: Althea is four plus years out from her treatment for breast cancer. At our last visit she was getting worked up for what sounded like microscopic colitis, she was treated and thankfully is no longer having diarrhea.   Neuropathy is stable, no recent falls.  She now is working out at the [employer] doing eliptical, walking and weight bearing exercises.   She is retired now after she lost the election for [employer] [facility] last fall but still traveling and active on [employer].     Denies any new virus, no infectious complaints, no HA, vision issues, no new back pain, radicular pain, no problems swallowing or talking.     EXAM:  /63 (BP Location: Right arm, Patient Position: Sitting, Cuff Size: Adult Regular)   Pulse 78   Temp 97.9  F (36.6  C) (Oral)   Resp 18   Ht 1.78 m (5' 10.08\")   Wt 68 kg (150 lb)   SpO2 100%   BMI 21.47 kg/m    Wt Readings from Last 4 Encounters:   06/03/19 68 kg (150 lb)   11/30/18 64.4 kg (142 lb)   09/04/18 68.9 kg (151 lb 14.4 oz)   05/18/18 70 kg (154 lb 4.8 oz)     Vital signs were reviewed.   Patient alert and oriented x3.   PERRLA. EOMI. No scleral icterus noted. OP without thrush/sores.  Neck " CARDIOLOGY     PROGRESS  NOTE   ________________________________________________    CHIEF COMPLAINT:Patient is a 81y old  Male who presents with a chief complaint of oozing from right groin procedure site (10 Nov 2019 09:48)  no conmplain.  	  REVIEW OF SYSTEMS:  CONSTITUTIONAL: No fever, weight loss, or fatigue  EYES: No eye pain, visual disturbances, or discharge  ENT:  No difficulty hearing, tinnitus, vertigo; No sinus or throat pain  NECK: No pain or stiffness  RESPIRATORY: No cough, wheezing, chills or hemoptysis; + Shortness of Breath  CARDIOVASCULAR: No chest pain, palpitations, passing out, dizziness, or leg swelling  GASTROINTESTINAL: No abdominal or epigastric pain. No nausea, vomiting, or hematemesis; No diarrhea or constipation. No melena or hematochezia.  GENITOURINARY: No dysuria, frequency, hematuria, or incontinence  NEUROLOGICAL: No headaches, memory loss, loss of strength, numbness, or tremors  SKIN: No itching, burning, rashes, or lesions   LYMPH Nodes: No enlarged glands  ENDOCRINE: No heat or cold intolerance; No hair loss  MUSCULOSKELETAL: No joint pain or swelling; No muscle, back, or extremity pain  PSYCHIATRIC: No depression, anxiety, mood swings, or difficulty sleeping  HEME/LYMPH: No easy bruising, or bleeding gums  ALLERGY AND IMMUNOLOGIC: No hives or eczema	    [ ] All others negative	  [ ] Unable to obtain    PHYSICAL EXAM:  T(C): 36.7 (11-10-19 @ 04:35), Max: 36.8 (11-09-19 @ 12:08)  HR: 78 (11-10-19 @ 04:35) (70 - 90)  BP: 145/87 (11-10-19 @ 04:35) (115/70 - 177/72)  RR: 18 (11-10-19 @ 04:35) (17 - 18)  SpO2: 95% (11-10-19 @ 04:35) (94% - 99%)  Wt(kg): --  I&O's Summary    09 Nov 2019 07:01  -  10 Nov 2019 07:00  --------------------------------------------------------  IN: 680 mL / OUT: 200 mL / NET: 480 mL        Appearance: Normal	  HEENT:   Normal oral mucosa, PERRL, EOMI	  Lymphatic: No lymphadenopathy  Cardiovascular: Normal S1 S2, No JVD, + murmurs, No edema  Respiratory: Lungs clear to auscultation	  Psychiatry: A & O x 3, Mood & affect appropriate  Gastrointestinal:  Soft, Non-tender, + BS, + ascites  Skin: No rashes, No ecchymoses, No cyanosis	  Neurologic: Non-focal  Extremities: Normal range of motion, No clubbing, cyanosis or edema  Vascular: Peripheral pulses palpable 2+ bilaterally    MEDICATIONS  (STANDING):  bicalutamide 50 milliGRAM(s) Oral daily  budesonide 160 MICROgram(s)/formoterol 4.5 MICROgram(s) Inhaler 2 Puff(s) Inhalation two times a day  carBAMazepine XR Tablet 200 milliGRAM(s) Oral two times a day  lidocaine 2% Injectable 20 milliLiter(s) Local Injection Once  metoprolol tartrate 25 milliGRAM(s) Oral three times a day  pantoprazole    Tablet 40 milliGRAM(s) Oral before breakfast      TELEMETRY: 	    ECG:  	  RADIOLOGY:  OTHER: 	  	  LABS:	 	    CARDIAC MARKERS:                                9.9    4.34  )-----------( 21       ( 10 Nov 2019 09:15 )             32.2     11-09    135  |  103  |  12  ----------------------------<  93  4.1   |  20<L>  |  1.03    Ca    7.5<L>      09 Nov 2019 06:09      proBNP:   Lipid Profile:   HgA1c:   TSH:         Assessment and plan  ---------------------------  metastatic ca  pleural effusion sec to malignancy  echo results noted  s/p platelets infusion  consider palliative care  discussed with family, onc as out pt  le edema and abdominal ascites sec to underlying disease   will add ace if bp remains high exam: No palpable cervical, supraclavicular or axillary nodes bilaterally.   Heart: RRR no murmurs noted.   Lungs: clear to auscultation bilaterally.  No crackles or wheezing.   Abd: positive bowel sounds in all four quadrants.  No tenderness to palpation.  No hepatomegaly.   Extremities: No lower extremity edema.   Neuro: grossly intact. Mildly unstable gait.   Mood and affect is stable.     LABS: none    ASSESSMENT/PLAN:     #onc: h/o triple negative breast cancer, s/p mastectomy reconstruction and weekly Taxol x 12 + AC.  No concerning s/s for cancer recurrence.  She is over 4 years out from therapy which is reassuring.     #GI: last fall diagnosed with microscopic colitis, treated and currently resolved.  Weight has stabilized     #CV: she has been holding her hydrochlorothiazide since last fall when she was dehydrated.  BP has been relatively stable.  She can discuss with her PCP, but probably fine to keep holding.      #neuropathy: continues with B vitamins and gabapentin.  Discussed continued exercise for stability and muscle strengthening.     #bones: given her mild instability with her neuropathy and h/o osteopenia (2007) recommended repeat DEXA scan.  Continue with Kolby Faith PA-c

## 2019-12-06 ENCOUNTER — ONCOLOGY VISIT (OUTPATIENT)
Dept: ONCOLOGY | Facility: CLINIC | Age: 79
End: 2019-12-06
Attending: PHYSICIAN ASSISTANT
Payer: MEDICARE

## 2019-12-06 ENCOUNTER — ANCILLARY PROCEDURE (OUTPATIENT)
Dept: BONE DENSITY | Facility: CLINIC | Age: 79
End: 2019-12-06
Attending: PHYSICIAN ASSISTANT
Payer: MEDICARE

## 2019-12-06 VITALS
DIASTOLIC BLOOD PRESSURE: 79 MMHG | SYSTOLIC BLOOD PRESSURE: 144 MMHG | OXYGEN SATURATION: 98 % | TEMPERATURE: 98.5 F | BODY MASS INDEX: 21.73 KG/M2 | HEART RATE: 71 BPM | WEIGHT: 151.8 LBS | HEIGHT: 70 IN

## 2019-12-06 DIAGNOSIS — Z17.1 MALIGNANT NEOPLASM OF LEFT BREAST IN FEMALE, ESTROGEN RECEPTOR NEGATIVE, UNSPECIFIED SITE OF BREAST (H): Primary | ICD-10-CM

## 2019-12-06 DIAGNOSIS — C50.912 MALIGNANT NEOPLASM OF LEFT BREAST IN FEMALE, ESTROGEN RECEPTOR NEGATIVE, UNSPECIFIED SITE OF BREAST (H): Primary | ICD-10-CM

## 2019-12-06 DIAGNOSIS — M85.80 OSTEOPENIA, UNSPECIFIED LOCATION: ICD-10-CM

## 2019-12-06 DIAGNOSIS — Z78.0 POST-MENOPAUSAL: ICD-10-CM

## 2019-12-06 DIAGNOSIS — G62.9 NEUROPATHY: ICD-10-CM

## 2019-12-06 PROCEDURE — G0463 HOSPITAL OUTPT CLINIC VISIT: HCPCS | Mod: ZF

## 2019-12-06 PROCEDURE — 99214 OFFICE O/P EST MOD 30 MIN: CPT | Mod: ZP | Performed by: INTERNAL MEDICINE

## 2019-12-06 ASSESSMENT — PAIN SCALES - GENERAL: PAINLEVEL: NO PAIN (0)

## 2019-12-06 ASSESSMENT — MIFFLIN-ST. JEOR: SCORE: 1255.08

## 2019-12-06 NOTE — NURSING NOTE
"Oncology Rooming Note    December 6, 2019 10:38 AM   Camille Thompson is a 79 year old female who presents for:    Chief Complaint   Patient presents with     Oncology Clinic Visit     Return visit. BREAST CANCER     Initial Vitals: Ht 1.78 m (5' 10.08\")   BMI 21.47 kg/m   Estimated body mass index is 21.47 kg/m  as calculated from the following:    Height as of this encounter: 1.78 m (5' 10.08\").    Weight as of 6/3/19: 68 kg (150 lb). Body surface area is 1.83 meters squared.  No Pain (0) Comment: Data Unavailable   No LMP recorded. Patient is postmenopausal.  Allergies reviewed: Yes  Medications reviewed: Yes    Medications: Medication refills not needed today.  Pharmacy name entered into [facility]:    [facility] - [place], [place] - [address] [id number] - [place], [place] - [address]    Clinical concerns: Dizziness and lack of balance. Loss of feeling in both feet.  Dr. Chavez was notified.      Carolina Higgins, [facility] [address]              "

## 2019-12-06 NOTE — LETTER
"     RE: Camille Thompson  [address] [place] [address] [address] [place] [address]     Dear Colleague,    Thank you for referring your patient, Camille Thompson, to the [facility] CANCER CLINIC. Please see a copy of my visit note below.    [facility] CANCER CLINIC  FOLLOW-UP VISIT NOTE  Date of visit: Dec 6, 2019    REASON FOR VISIT: breast cancer, coming in for routine 6 month follow up    HPI: Stage I (T1 N0 M0) infiltrating ductal carcinoma of the left breast, ER negative, [place] negative, HER-2/batsheva negative. The patient is status post bilateral mastectomy on 01/29/2015. Pathology revealed an infiltrating ductal carcinoma, [place] grade 3. Tumor size was 1.1 cm. Margins were not involved. She had zero of 5 sentinel lymph nodes positive. The right breast showed no malignancy. The patient completed weekly Taxol x 11 from 03/06/2015 to 05/15/2015. Last dose of Taxol was held due to LE erythema.  She completed dose dense AC (adriamycin/cytoxan) x 4 cycles from 06/05/2015 to 07/24/2015.    INTERVAL HISTORY: Althea is five plus years out from her treatment for breast cancer. Her main concern today is unsteadiness on her feet. She thinks this occurs when getting up from bed or out of a chair, but can also occur when walking down the street. She has no associated HA or focal weakness, but she thinks the right side of her body is a little weaker. She has no N&V or true vertigo with these symptoms. She thinks this has been worse in the past 6 months or so.     Her neuropathy remains stable. 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     She has not been exercising regularly    ROS is otherwise negative. She has no new symptoms except for the unsteadiness noted above.    EXAM:  BP (!) 144/79   Pulse 71   Temp 98.5  F (36.9  C) (Oral)   Ht 1.78 m (5' 10.08\")   Wt 68.9 kg (151 lb 12.8 oz)   SpO2 98%   BMI 21.73 kg/m     Wt Readings from Last 4 Encounters:   12/06/19 68.9 kg (151 lb 12.8 oz)   06/03/19 68 kg (150 lb) "   11/30/18 64.4 kg (142 lb)   09/04/18 68.9 kg (151 lb 14.4 oz)     Vital signs were reviewed.   Patient alert and oriented x3.   PERRLA. EOMI. No scleral icterus noted. No nystagmus  Neck exam: No palpable cervical, supraclavicular or axillary nodes bilaterally.   Heart: RRR no murmurs noted.   Lungs: clear to auscultation bilaterally.  No crackles or wheezing.   Abd: positive bowel sounds in all four quadrants.  No tenderness to palpation.  No hepatomegaly.   Extremities: No lower extremity edema.   Neuro: grossly intact. Cranial nerves intact. No cerebellar signs  Mood and affect is stable.     LABS: none    IMAGING: Dexa scan shows low bone density    PROBLEMS:     1. 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 Low bone density  3. 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 Dizziness    IMPRESSION: Ms. Thompson's main concern is her dizziness. I think this is a middle ear issue, but given the breast cancer history, we will get a MRI. Also refer to ENT.    She is now far enough out that we can see her annually.    PLAN:  1. Get brain MRI - will contact patient with results  2. ENT referral for likely middle ear symptoms  3. RTC in 1 year unless brain MRI shows something unexpected.  4. Contact us sooner as necessary.    Nacho Chavez MD

## 2019-12-06 NOTE — PROGRESS NOTES
"[facility] CANCER CLINIC  FOLLOW-UP VISIT NOTE  Date of visit: Dec 6, 2019        REASON FOR VISIT: breast cancer, coming in for routine 6 month follow up    HPI: Stage I (T1 N0 M0) infiltrating ductal carcinoma of the left breast, ER negative, [place] negative, HER-2/batsheva negative. The patient is status post bilateral mastectomy on 01/29/2015. Pathology revealed an infiltrating ductal carcinoma, [place] grade 3. Tumor size was 1.1 cm. Margins were not involved. She had zero of 5 sentinel lymph nodes positive. The right breast showed no malignancy. The patient completed weekly Taxol x 11 from 03/06/2015 to 05/15/2015. Last dose of Taxol was held due to LE erythema.  She completed dose dense AC (adriamycin/cytoxan) x 4 cycles from 06/05/2015 to 07/24/2015.    INTERVAL HISTORY: Althea is five plus years out from her treatment for breast cancer. Her main concern today is unsteadiness on her feet. She thinks this occurs when getting up from bed or out of a chair, but can also occur when walking down the street. She has no associated HA or focal weakness, but she thinks the right side of her body is a little weaker. She has no N&V or true vertigo with these symptoms. She thinks this has been worse in the past 6 months or so.     Her neuropathy remains stable. 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     She has not been exercising regularly    ROS is otherwise negative. She has no new symptoms except for the unsteadiness noted above.    EXAM:  BP (!) 144/79   Pulse 71   Temp 98.5  F (36.9  C) (Oral)   Ht 1.78 m (5' 10.08\")   Wt 68.9 kg (151 lb 12.8 oz)   SpO2 98%   BMI 21.73 kg/m    Wt Readings from Last 4 Encounters:   12/06/19 68.9 kg (151 lb 12.8 oz)   06/03/19 68 kg (150 lb)   11/30/18 64.4 kg (142 lb)   09/04/18 68.9 kg (151 lb 14.4 oz)     Vital signs were reviewed.   Patient alert and oriented x3.   PERRLA. EOMI. No scleral icterus noted. No nystagmus  Neck exam: No palpable cervical, " supraclavicular or axillary nodes bilaterally.   Heart: RRR no murmurs noted.   Lungs: clear to auscultation bilaterally.  No crackles or wheezing.   Abd: positive bowel sounds in all four quadrants.  No tenderness to palpation.  No hepatomegaly.   Extremities: No lower extremity edema.   Neuro: grossly intact. Cranial nerves intact. No cerebellar signs  Mood and affect is stable.     LABS: none    IMAGING: Dexa scan shows low bone density    PROBLEMS:     1. 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 Low bone density  3. 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 Dizziness    IMPRESSION: Ms. Thompson's main concern is her dizziness. I think this is a middle ear issue, but given the breast cancer history, we will get a MRI. Also refer to ENT.    She is now far enough out that we can see her annually.    PLAN:  1. Get brain MRI - will contact patient with results  2. ENT referral for likely middle ear symptoms  3. RTC in 1 year unless brain MRI shows something unexpected.  4. Contact us sooner as necessary.    Nacho Chavez MD   22-Jul-2017 03:14

## 2019-12-10 ENCOUNTER — TELEPHONE (OUTPATIENT)
Dept: OTOLARYNGOLOGY | Facility: CLINIC | Age: 79
End: 2019-12-10

## 2019-12-10 NOTE — TELEPHONE ENCOUNTER
1. Have you noticed any changes in hearing? No  2. Do you have ringing, buzzing, or other sounds in your ears or head, this is also referred to as Tinnitus? Sometimes: slight ringing   3. When and where was your last hearing test? [facility] ([place])  4. Do you feel lightheaded or foggy? Yes  5. Do you have a spinning sensation? No  6. Is there any specific position that can bring on dizziness? Postural (getting up fast)  7. Does looking up cause dizziness? No  8. Does getting in and our of bed cause dizziness? Yes  9. Does turning over in bed increase or cause dizziness? No  10. Does bending over cause dizziness? Yes  11. Is there anything that you can do to prevent the dizziness? Run its course  12. Has the dizziness gotten better with time? No  13. Have you seen Physical Therapy for dizziness? (Please indicate clinic and as much of the location as possible): No  14. Are you being referred to a specific physician? No  15. Have you been evaluated/treated for your dizziness at any other location?  (If yes,obtian as much clinic/provider/locaiton as possible) No. (If yes answer the following questions:)   Have you seen any ENT, Neurology, or other providers for these symptoms?             No   Have you had any balance or Audiology testing? No Have you had an MRI or CT scan of your head or neck? Yes, If yes, where? MRI 12/20 at [facility] if yes, who?     Would you like to receive your Release of Information by mail or e-mail?  e-mail

## 2019-12-12 NOTE — TELEPHONE ENCOUNTER
FUTURE VISIT INFORMATION      FUTURE VISIT INFORMATION:    Date: 2/11/20    Time: 10AM    Location[place] [facility] [place]  REFERRAL INFORMATION:    Referring provider:  Nacho Chavez MD    Referring providers clinic[facility]  [facility]     Reason for visit/diagnosis Dizzy    RECORDS REQUESTED FROM:       Clinic name Comments Records Status Imaging Status   [facility] Cancer Oncology  12/6/19 notes with Dr Chavez [facility]    [facility] imaging 12/20/19 MR Brain (scheduled)  [facility] Audiology  03/23/2017 audiogram with Sandy Suarez AU.D.  realmita 12/12 - received 12/16 sent to scan     [email or web address] 6/26/18 audiogram  Sent to scan 12/16 12/12/19 5:05PM sent a fax to [employer] for audiogram . An email has been sent to patient to obtain [email or web address] Audio- Amay   12/16/19 10:38AM received Audiogram from [employer], sent to scan and waiting on emily for [email or web address] audiogram - Amay   12/16/19 2:06PM received [employer] audiogram , sent to scan. A message has been sent to audiology to review - Amay

## 2019-12-16 NOTE — TELEPHONE ENCOUNTER
Per record review: Patient referred for dizziness from Dr. Chavez from [facility]. Per Dr. Chavez's note, patient's main concern is unsteadiness on her feet. Patient has previous history of breast cancer (5+ years post treatment). Most recent hearing test was performed at [facility] on 6/28/2019 and revealed mild to moderately-severe SNHL, bilaterally. MRI is scheduled for 12/20/2019.    I will request orders for a hearing test, VNG and rotary chair testing prior to their appt with Dr. Nissen on 2/11/2019. *may be subject to change pending physician review.     Yeimi Lovell. CCC-A  Licensed Audiologist   [place] #[id number]

## 2019-12-18 DIAGNOSIS — R42 DIZZINESS: Primary | ICD-10-CM

## 2019-12-19 ENCOUNTER — TELEPHONE (OUTPATIENT)
Dept: AUDIOLOGY | Facility: CLINIC | Age: 79
End: 2019-12-19

## 2019-12-20 ENCOUNTER — ANCILLARY PROCEDURE (OUTPATIENT)
Dept: MRI IMAGING | Facility: CLINIC | Age: 79
End: 2019-12-20
Attending: INTERNAL MEDICINE
Payer: MEDICARE

## 2019-12-20 DIAGNOSIS — C50.912 MALIGNANT NEOPLASM OF LEFT BREAST IN FEMALE, ESTROGEN RECEPTOR NEGATIVE, UNSPECIFIED SITE OF BREAST (H): ICD-10-CM

## 2019-12-20 DIAGNOSIS — Z17.1 MALIGNANT NEOPLASM OF LEFT BREAST IN FEMALE, ESTROGEN RECEPTOR NEGATIVE, UNSPECIFIED SITE OF BREAST (H): ICD-10-CM

## 2019-12-20 DIAGNOSIS — G62.9 NEUROPATHY: ICD-10-CM

## 2019-12-20 RX ORDER — GADOBUTROL 604.72 MG/ML
7.5 INJECTION INTRAVENOUS ONCE
Status: COMPLETED | OUTPATIENT
Start: 2019-12-20 | End: 2019-12-20

## 2019-12-20 RX ADMIN — GADOBUTROL 7 ML: 604.72 INJECTION INTRAVENOUS at 18:00

## 2019-12-21 NOTE — DISCHARGE INSTRUCTIONS
MRI Contrast Discharge Instructions    The IV contrast you received today will pass out of your body in your  urine. This will happen in the next 24 hours. You will not feel this process.  Your urine will not change color.    Drink at least 4 extra glasses of water or juice today (unless your doctor  has restricted your fluids). This reduces the stress on your kidneys.  You may take your regular medicines.    If you are on dialysis: It is best to have dialysis today.    If you have a reaction: Most reactions happen right away. If you have  any new symptoms after leaving the hospital (such as hives or swelling),  call your hospital at the correct number below. Or call your family doctor.  If you have breathing distress or wheezing, call 911.    Special instructions: ***    I have read and understand the above information.    Signature:______________________________________ Date:___________    Staff:__________________________________________ Date:___________     Time:__________    [place] [facility] Departments:    ___[place]: [phone]  ___[facility]: [phone]  ___[place]: [phone] ___[facility]: [phone]  ___[facility]: [phone]  ___[facility]: [phone]  ___[phone]  ___[facility]: [phone]  ___[phone]

## 2020-01-13 ENCOUNTER — PATIENT OUTREACH (OUTPATIENT)
Dept: ONCOLOGY | Facility: CLINIC | Age: 80
End: 2020-01-13

## 2020-01-13 NOTE — PROGRESS NOTES
"Spoke to patient with Brain MRI as requested with \"Diffuse advanced cerebral and moderate cerebellar volume loss.\" Will send results to Zainab Faith for recommendations and patient will see her PMD end of the month. Vee Molina RN, BSN Breast Center Nurse Coordinator   Called patient with Zainab's recommendations of normal loss of brain volume yearly as patient's age and if having neurological symptoms will send to see a Neurologist and encouraged patient to discuss with her PMD that she will see in two weeks. Patient currently not having any neurological symptoms. Vee Molina RN, BSN Breast Center Nurse Coordinator      "

## 2020-01-21 ENCOUNTER — TRANSFERRED RECORDS (OUTPATIENT)
Dept: HEALTH INFORMATION MANAGEMENT | Facility: CLINIC | Age: 80
End: 2020-01-21

## 2020-01-21 NOTE — PROGRESS NOTES
AUDIOLOGY REPORT-BALANCE ASSESSMENT    SUBJECTIVE: Camille Thompson, 79 year old, was seen in Audiology at the [facility] [place] on 1/24/2020, for videonystagmography (VNG) and rotational chair testing referred by Rick Nissen, M.D. Patient presents with chief complaints of constant unsteadiness and episodic internal vertigo, nausea and dizziness. Patient reports internal vertigo, nausea and dizziness are provoked by laying down, transitioning from a supine to seated position, transitioning from a seated to standing position and looking up for an extended period of time. Patient states that symptoms are worse in the morning and progressively improve throughout the day. She reports increased unsteadiness with heights, open spaces, narrow hallways and going down stairs. She denies visual motion sensitivity such as shopping, etc. She reports some motion intolerance when driving or riding in the car on winding roads. She denies symptoms being provoked with positional changes such as rolling over in bed. Symptoms are alleviated by sitting still or moving slowly.     Patient reports symptoms onset many years ago; however, have increased in severity over the past few months. She denies any illness, cold/flu symptoms or other significant events around the time symptoms began increasing in severity. She reports a previous history of breast cancer with chemotherapy approximately five years ago. Patient reports a more recent diagnosis of neuropathy in her hands and feet contributed to her previous cancer treatments. She reports veering to her left while walking and notes at least two previous falls. One fall resulted in her falling backwards and hitting her head, she denies loss of consciousness and contributes this to her neuropathy. Another fall occurred in 1987 which resulted in a broken jaw and wrist. Patient reports significant concerns for future falls. Patient denies double vision but  "notes blurred vision in her left eye, describes as \"foggy.\" Patient reports history of bilateral cataract surgery two years ago. She denies any additional eye surgeries. Patient notes that she will be consulting with her eye doctor in February.      Patient has consulted with oncology, primary care physician, and neurology regarding her current symptoms. Her most recent hearing test was performed at [facility] on 6/28/2019 and revealed mild to moderately-severe SNHL, bilaterally. Most recent MRI performed on 12/20/2019 revealed \"no evidence of intracranial metastatic disease. Diffuse advanced cerebral and moderate cerebellar volume loss.\"     Patient reports utilizing bilateral amplification. She notes some itchiness in her left ear. She denies otalgia, drainage, aural fullness, and history of otologic surgeries or significant ear infections. Patient denies tullio's phenonemon, hennebert's sign, and abnormal perception of bodily sounds (eyeblinks, ect.). Patient reports a previous history of migraine headaches onsetting in her early teens and ceasing a couple years ago. Patient reports nausea and photophobia with her migraines. Patient reports taking gabapentin 300 mg for many years; she states that she has been discussing decreasing the dose to 100 mg with her primary care physician.     Patient denies consumption of caffeinated beverages, nicotine, alcoholic substances, or use of medications with known vestibular interactions within the past 48 hours, with the exception of a small dose of melatonin last night.     OBJECTIVE:    Dizziness Handicap Inventory (DHI): 22/100 Mild perceived impairment    Rotational chair testing:   Sinusoidal harmonic acceleration test: 0.01, 0.02, 0.04, 0.08, 0.16, 0.32 and 0.64 Hz.  Spontaneous nystagmus: Absent, saccadic intrusions noted.  Phase: Abnormal low frequency phase lead (0.01-0.04 Hz) sloping to normal phase (0.08-0.64 Hz)  Gain: Normal gain across all frequencies.  Symmetry: " Normal across all frequencies.  Spectral Purity: Normal across all frequencies.   Overall rotational chair test: Suggestive of a compensated peripheral lesion.     Videonystagmography (VNG) testing:  Prescreening:  Tympanograms: Normal eardrum mobility, bilaterally. Note: this test is completed to determine the status of the middle ear before irrigations are completed. Patient denies dizziness with tympanometry.  Ocular range of motion and ocular counter roll: Normal  Cross/cover: Normal  Head Thrust: Normal to right and left.    Nystagmus Tests:  Gaze-Horizontal with Fixation:  *significant saccadic intrusions throughout all positions.   Center: Normal   Right: Normal   Left: Normal  Gaze-Vertical with Fixation:  *significant saccadic intrusions throughout all positions.   Up: Normal   Down: Normal  Gaze with Fixation Denied  *significant saccadic intrusions throughout all positions.   Center: Normal   Right: Normal   Left: Normal   Up: Normal  High Frequency Headshake:   Horizontal: Negative. No nystagmus or symptoms.    Vertical: Negative. No symptoms. 2 deg/sec left beat    Raven-Hallpike Head Right: saccadic intrusions throughout. Patient reported mild dizziness at 35 sec lasting approximately 30 sec. No nystagmus. Seated: few left beats noted, patient reported mild internal spinning and nausea upon rising to seated position.   Repeat Raven-Hallpike Head Right: Negative for PC BPPV. Non-fatiguing 2 deg/sec right beat. Seated: patient reported nausea upon rising to seated position.  [place]-Hallpike Head Left: Negative for PC BPPV. Non-fatiguing 2 deg/sec left beat. Seated: patient reported nausea upon rising to seated position.   Roll Test Head Right: Saccadic intrusions throughout. No nystagmus or symptoms.  Roll Test Head Left: Saccadic intrusions throughout. No nystagmus or symptoms.  Positional Head Hanging: Head straight back extended/hanging: Saccadic intrusions throughout. No nystagmus. Patient reported mild  nausea upon rising to seated position.  Repeat Positional Head Hanging: Right Epley progression: no nystagmus throughout. Patient reported mild nausea and dizziness upon rising to seated position; likely inconsistent with atypical BPPV. Saccadic intrusions throughout.    Positional Testing:  Positionals: Supine: Normal, saccadic intrusions with and without fixation.   Positionals: Body Right: 4 deg/sec right beat, unable to suppress with fixation.  Positionals: Body Left: 6 deg/sec right beat, unable to suppress with fixation.  Positionals: Pre-Caloric: Normal, saccadic intrusions throughout.    Oculomotor Tests:  Saccades (repeatable): Abnormal with reduced velocity and saccadic intrusions throughout.   Anti-saccades: Normal.  Pursuit (repeatable): Abnormal with poor morphology, reduced gain at 0.4 and 0.6 Hz and saccadic intrusions throughout.     Calorics :  (Tested at 44 degrees and 30 degrees Celsius for 30 seconds for warm and cool water, respectively):  Right Warm Eye Speed: 43 degrees per second right beating  Left Warm Eye Speed: 42 degrees per second left beating  Right Cool Eye Speed: 20 degrees per second left beating  Left Cool Eye Speed: 25 degrees per second right beating  Difference between ear: 3% right hypofunction. (Greater than 25% considered clinically significant.)  Fixation Index: 0.14 or 14% Normal  Overall caloric test: Normal    ASSESSMENT:  1. Indications of central vestibular system involvement noted on today's exam were as follows:     -Abnormal Saccades and Pursuit testing (repeatable); cannot rule out effects of attention or fatigue.    -Significant saccadic intrusions present with and without fixation throughout all testing.    -Non-fatiguing right beating nystagmus present with and without fixation evident in the following assessments:   -2 of 4 conditions of Positionals.     2. There were no significant indications of current peripheral vestibular system involvement noted on today's  exam.     3. Possible indications of vascular influences noted on today's exam were as follows:     -Presence of mild non-fatiguing nystagmus and/or patient reporting dizziness and nausea upon rising to seated position from supine/extended supine position as evident in the following assessments:   -Right [place]-Hallpike and Repeat Right [place]-Hallpike   -Left [place]-Hallpike   -Center Positional Head Hanging   -Repeat Positional Head Hanging    PLAN:  Proceed with hearing evaluation as scheduled. Follow-up with Dr. Rick Nissen regarding today's results and for medical management on 2/11/2020. Please call this clinic at [phone] with questions regarding these results or recommendations.       Yeimi Lovell. [facility]  Clinical Audiologist   [place] #[id number]

## 2020-01-24 ENCOUNTER — OFFICE VISIT (OUTPATIENT)
Dept: AUDIOLOGY | Facility: CLINIC | Age: 80
End: 2020-01-24
Payer: MEDICARE

## 2020-01-24 DIAGNOSIS — R42 DIZZINESS: ICD-10-CM

## 2020-02-10 DIAGNOSIS — H91.90 HEARING LOSS, UNSPECIFIED HEARING LOSS TYPE, UNSPECIFIED LATERALITY: Primary | ICD-10-CM

## 2020-02-11 ENCOUNTER — OFFICE VISIT (OUTPATIENT)
Dept: AUDIOLOGY | Facility: CLINIC | Age: 80
End: 2020-02-11
Attending: OTOLARYNGOLOGY
Payer: MEDICARE

## 2020-02-11 ENCOUNTER — OFFICE VISIT (OUTPATIENT)
Dept: OTOLARYNGOLOGY | Facility: CLINIC | Age: 80
End: 2020-02-11
Payer: MEDICARE

## 2020-02-11 ENCOUNTER — PRE VISIT (OUTPATIENT)
Dept: OTOLARYNGOLOGY | Facility: CLINIC | Age: 80
End: 2020-02-11

## 2020-02-11 VITALS
RESPIRATION RATE: 14 BRPM | TEMPERATURE: 97.1 F | HEART RATE: 70 BPM | DIASTOLIC BLOOD PRESSURE: 67 MMHG | HEIGHT: 70 IN | SYSTOLIC BLOOD PRESSURE: 157 MMHG | BODY MASS INDEX: 21.76 KG/M2 | WEIGHT: 152 LBS

## 2020-02-11 DIAGNOSIS — R42 DIZZINESS: Primary | ICD-10-CM

## 2020-02-11 DIAGNOSIS — L29.9 PRURITUS: ICD-10-CM

## 2020-02-11 DIAGNOSIS — H90.3 SENSORY HEARING LOSS, BILATERAL: Primary | ICD-10-CM

## 2020-02-11 DIAGNOSIS — H91.90 HEARING LOSS, UNSPECIFIED HEARING LOSS TYPE, UNSPECIFIED LATERALITY: ICD-10-CM

## 2020-02-11 DIAGNOSIS — R42 DIZZINESS: ICD-10-CM

## 2020-02-11 ASSESSMENT — PAIN SCALES - GENERAL: PAINLEVEL: NO PAIN (0)

## 2020-02-11 ASSESSMENT — MIFFLIN-ST. JEOR: SCORE: 1255.97

## 2020-02-11 NOTE — PROGRESS NOTES
AUDIOLOGY REPORT    SUBJECTIVE:  Camille Thompson is a 79 year old female who was seen in the Audiology Clinic at the [facility] for audiologic evaluation, referred by Rick L Nissen,M.D. . The patient has been seen previously at [facility] on 6/26/2018 for assessment and results indicated a mild moderate bilateral sensorineural hearing loss. The patient reports increased dizziness since October/November 2019, when walking and lying down. She previously noted postural dizziness. The patient denies bilateral otalgia, bilateral aural fullness and change in hearing. She had chemotherapy for breast cancer about 5 years prior, and has had neuropathy since that time.    OBJECTIVE:  Otoscopic exam indicated ears are clear of cerumen bilaterally     Pure Tone Thresholds assessed using conventional audiometry with good, reliability from 250-8000 Hz bilaterally using insert earphones and circumaural headphones     RIGHT:  mild sloping to moderate sensorineural hearing loss    LEFT:   mild sloping to moderate sensorineural hearing loss    High frequency audiometry from 9,000-20,000 Hz was performed, which is within aged norms. There is no comparison baseline available.    Distortion product otoacoustic emissions were not performed due to her degree of hearing loss.  Tympanogram:    RIGHT: normal eardrum mobility    LEFT:  normal eardrum mobility    Reflexes (reported by stimulus ear):  RIGHT: Ipsilateral: present at normal levels  RIGHT: Contralateral: absent at frequencies tested  LEFT:   Ipsilateral: absent at frequencies tested  LEFT:   Contralateral: could not interpret due to positive response    Speech Reception Threshold:    RIGHT: 30 dB HL    LEFT:   30 dB HL  Word Recognition Score:     RIGHT: 100% at 75 dB HL using NU-6 recorded word list.    LEFT:   100% at 75 dB HL using NU-6 recorded word list.      ASSESSMENT:   A mild/moderate bilateral sensorineural hearing loss was found today. Compared  to patient's previous audiogram dated 6/26/2018, hearing is stable.  Age appropriate extended high frequency findings.  Today s results were discussed with the patient.     PLAN:  Patient will be seeing Dr. Rick Nissen subsequent to this appointment, with follow-up testing per medical recommendations.  Please call this clinic with questions regarding these results or recommendations.    Ora Yanes, CCC-A  Licensed Audiologist  [place] #[id number]    CC:  Dr. Rick Nissen

## 2020-02-11 NOTE — Clinical Note
Not sure why she was scheduled for a chemo audio but she had chemo and then neuropathy 5 years prior.  Stable hearing from June 2018, and age appropriate extended high frequency results

## 2020-02-11 NOTE — LETTER
2/11/2020       RE: Camille Thompson  [address] [place] [address] [address] [place] [place] [address]     Dear Colleague,    Thank you for referring your patient, Camille Thompson, to the [facility] EAR NOSE AND THROAT at [facility]. Please see a copy of my visit note below.    Dear Reanna Lu:    I had the pleasure of meeting Camille Thompson in consultation today at the [facility] at your request.    CHIEF COMPLAINT: Dizziness    HISTORY OF PRESENT ILLNESS: Patient is a 79-year-old in today for assessment of dizziness.  She has had positional dizziness for many years, she is unable to lay flat, usually sleeps with several pillows or on her side.  More recently she is noticed problems when she is getting up from bed, she notices some off balance and lightheadedness which only last for seconds to a minute, once it passes she is fine.  She is just been a little off when she is walking, just feels a little uncertain.  She denies any episodes of spinning or vertigo.  She does feel she has some hearing loss in both ears, no asymmetry, does have hearing aids which are helpful.  Does have bilateral tinnitus and has had for years, not problematic.  She denies any pain or drainage in her ears.  There is no dysphagia, hoarseness, facial paresthesias.    ALLERGIES:    Allergies   Allergen Reactions     Codeine Nausea, Nausea and Vomiting and Unknown     Penicillins Dermatitis, Swelling, Hives and Unknown     Docetaxel Other (See Comments)     Legs swelled, painful, itching redness (Chemo taxol)  Legs swelled, painful, itching redness (Chemo taxol)       HABITS: Social History    Substance and Sexual Activity      Alcohol use: Yes        Alcohol/week: 0.0 standard drinks        Comment: 1 / day     History   Smoking Status     Former Smoker     Packs/day: 0.25     Years: 5.00     Types: Cigarettes     Start date: 6/1/1958     Quit date: 5/31/1964   Smokeless Tobacco     Never Used          PAST MEDICAL HISTORY: Please see today's intake form (for the remainder of the PMH) which I reviewed and signed.  Past Medical History:   Diagnosis Date     Basal cell carcinoma      Benign positional vertigo      Hearing problem      Tinnitus        FAMILY HISTORY/SOCIAL HISTORY:   Family History   Problem Relation Age of Onset     Cancer Father      Osteoporosis Mother      Skin Cancer No family hx of         no family HX of skin cancer      Social History     Socioeconomic History     Marital status:      Spouse name: Not on file     Number of children: Not on file     Years of education: Not on file     Highest education level: Not on file   Occupational History     Not on file   Social Needs     Financial resource strain: Not on file     Food insecurity:     Worry: Not on file     Inability: Not on file     Transportation needs:     Medical: Not on file     Non-medical: Not on file   Tobacco Use     Smoking status: Former Smoker     Packs/day: 0.25     Years: 5.00     Pack years: 1.25     Types: Cigarettes     Start date: 1958     Last attempt to quit: 1964     Years since quittin.7     Smokeless tobacco: Never Used   Substance and Sexual Activity     Alcohol use: Yes     Alcohol/week: 0.0 standard drinks     Comment: 1 / day     Drug use: Never     Sexual activity: Yes     Partners: Male     Birth control/protection: Post-menopausal   Lifestyle     Physical activity:     Days per week: Not on file     Minutes per session: Not on file     Stress: Not on file   Relationships     Social connections:     Talks on phone: Not on file     Gets together: Not on file     Attends Christianity service: Not on file     Active member of club or organization: Not on file     Attends meetings of clubs or organizations: Not on file     Relationship status: Not on file     Intimate partner violence:     Fear of current or ex partner: Not on file     Emotionally abused: Not on file     Physically  abused: Not on file     Forced sexual activity: Not on file   Other Topics Concern     Parent/sibling w/ CABG, MI or angioplasty before 65F 55M? Not Asked   Social History Narrative     Not on file       REVIEW OF SYSTEMS: Patient Supplied Answers to Review of Systems   ENT ROS 2/8/2020   Neurology Dizzy spells, Numbness   Ears, Nose, Throat Hearing loss, Ringing/noise in ears            The remainder of the 10 point ROS is negative    PHYSICIAL EXAMINATION:  Constitutional: The patient was well-groomed and in no acute distress.   Skin: Warm and pink.  Psychiatric: The patient's affect was calm, cooperative, and appropriate.   Respiratory: Breathing comfortably without stridor or exertion of accessory muscles.  Eyes: Pupils were equal and reactive. Extraocular movement intact.   Head: Normocephalic and atraumatic. No lesions or scars.  Ears: Patient placed under the microscope for microscopic evaluation and cleaning of cerumen which was obscuring full visualization and complete assessment of both TMs. Under high power magnification, the right ear was examined and cleaned of cerumen using curet, alligator forceps, and suction.  After cleaning, TM is fully visualized and has normal position with normal middle ear aeration. The left ear was then cleaned and inspected using microscope, instruments and similar techniques. After cleaning of cerumen, the TM has normal position with normal aeration to middle ear.  Nose: Sinuses were nontender. Anterior rhinoscopy revealed midline septum and absence of purulence or polyps.  Oral Cavity: Normal tongue, floor of mouth, buccal mucosa, and palate. No lesions or masses on inspection or palpation. No abnormal lymph tissue in the oropharynx.   Neck: The parotid is soft without masses. Supple with normal laryngeal and tracheal landmarks.   Lymphatic: There is no palpable lymphadenopathy or other masses in the neck.   Neurologic: Alert and oriented x 3. Cranial nerves III-XI within  normal limits. Voice quality normal.  Cerebellar Function Tests:  Grossly normal    Audiogram: Audiogram performed shows a bilateral mild to moderate downsloping high-frequency sensorineural hearing loss.  It looks symmetrical.  100% discrimination in both ears.  Normal type A tympanograms bilaterally.    VNG: Vestibular testing completed recently, it shows normal responses both vestibular system to caloric responses.  No asymmetry noted.      IMPRESSION AND PLAN:   1. Dizziness: Discussed with her probable postural hypotension and to be careful when she gets up quickly, once it passes she should be fine.  Also possible BPV and discussed this as well in detail.  See no concerning problems with the inner ear and recommended she undergo vestibular rehabilitation through physical therapy to help both with the possible BPV but also with unsteadiness when she is moving.  Again reassured her that ears look fine today.  2. Bilateral sensorineural hearing loss: Continue to maximize use with hearing aids.  Protect ears from any significant noise exposure.  3. Bilateral tinnitus: Secondary to sensorineural hearing loss, no treatment needed, monitor.  4. Excessive cerumen: Cleaned today, no further treatment needed, monitor.    Thank you very much for the opportunity to participate in the care of your patient.    Rick L Nissen MD          Again, thank you for allowing me to participate in the care of your patient.      Sincerely,    Rick L. Nissen, MD

## 2020-02-11 NOTE — NURSING NOTE
"Chief Complaint   Patient presents with     Consult     Dizziness      Blood pressure (!) 157/67, pulse 70, temperature 97.1  F (36.2  C), resp. rate 14, height 1.78 m (5' 10.08\"), weight 68.9 kg (152 lb), not currently breastfeeding.    Nik Samano LPN    "

## 2020-02-12 NOTE — PROGRESS NOTES
Dear Reanna Lu:    I had the pleasure of meeting Camille Thompson in consultation today at the [facility] at your request.    CHIEF COMPLAINT: Dizziness    HISTORY OF PRESENT ILLNESS: Patient is a 79-year-old in today for assessment of dizziness.  She has had positional dizziness for many years, she is unable to lay flat, usually sleeps with several pillows or on her side.  More recently she is noticed problems when she is getting up from bed, she notices some off balance and lightheadedness which only last for seconds to a minute, once it passes she is fine.  She is just been a little off when she is walking, just feels a little uncertain.  She denies any episodes of spinning or vertigo.  She does feel she has some hearing loss in both ears, no asymmetry, does have hearing aids which are helpful.  Does have bilateral tinnitus and has had for years, not problematic.  She denies any pain or drainage in her ears.  There is no dysphagia, hoarseness, facial paresthesias.    ALLERGIES:    Allergies   Allergen Reactions     Codeine Nausea, Nausea and Vomiting and Unknown     Penicillins Dermatitis, Swelling, Hives and Unknown     Docetaxel Other (See Comments)     Legs swelled, painful, itching redness (Chemo taxol)  Legs swelled, painful, itching redness (Chemo taxol)       HABITS: Social History    Substance and Sexual Activity      Alcohol use: Yes        Alcohol/week: 0.0 standard drinks        Comment: 1 / day     History   Smoking Status     Former Smoker     Packs/day: 0.25     Years: 5.00     Types: Cigarettes     Start date: 6/1/1958     Quit date: 5/31/1964   Smokeless Tobacco     Never Used         PAST MEDICAL HISTORY: Please see today's intake form (for the remainder of the PMH) which I reviewed and signed.  Past Medical History:   Diagnosis Date     Basal cell carcinoma      Benign positional vertigo      Hearing problem      Tinnitus        FAMILY HISTORY/SOCIAL  HISTORY:   Family History   Problem Relation Age of Onset     Cancer Father      Osteoporosis Mother      Skin Cancer No family hx of         no family HX of skin cancer      Social History     Socioeconomic History     Marital status:      Spouse name: Not on file     Number of children: Not on file     Years of education: Not on file     Highest education level: Not on file   Occupational History     Not on file   Social Needs     Financial resource strain: Not on file     Food insecurity:     Worry: Not on file     Inability: Not on file     Transportation needs:     Medical: Not on file     Non-medical: Not on file   Tobacco Use     Smoking status: Former Smoker     Packs/day: 0.25     Years: 5.00     Pack years: 1.25     Types: Cigarettes     Start date: 1958     Last attempt to quit: 1964     Years since quittin.7     Smokeless tobacco: Never Used   Substance and Sexual Activity     Alcohol use: Yes     Alcohol/week: 0.0 standard drinks     Comment: 1 / day     Drug use: Never     Sexual activity: Yes     Partners: Male     Birth control/protection: Post-menopausal   Lifestyle     Physical activity:     Days per week: Not on file     Minutes per session: Not on file     Stress: Not on file   Relationships     Social connections:     Talks on phone: Not on file     Gets together: Not on file     Attends Oriental orthodox service: Not on file     Active member of club or organization: Not on file     Attends meetings of clubs or organizations: Not on file     Relationship status: Not on file     Intimate partner violence:     Fear of current or ex partner: Not on file     Emotionally abused: Not on file     Physically abused: Not on file     Forced sexual activity: Not on file   Other Topics Concern     Parent/sibling w/ CABG, MI or angioplasty before 65F 55M? Not Asked   Social History Narrative     Not on file       REVIEW OF SYSTEMS: Patient Supplied Answers to Review of Systems  [facility] ROS  2/8/2020   Neurology Dizzy spells, Numbness   Ears, Nose, Throat Hearing loss, Ringing/noise in ears            The remainder of the 10 point ROS is negative    PHYSICIAL EXAMINATION:  Constitutional: The patient was well-groomed and in no acute distress.   Skin: Warm and pink.  Psychiatric: The patient's affect was calm, cooperative, and appropriate.   Respiratory: Breathing comfortably without stridor or exertion of accessory muscles.  Eyes: Pupils were equal and reactive. Extraocular movement intact.   Head: Normocephalic and atraumatic. No lesions or scars.  Ears: Patient placed under the microscope for microscopic evaluation and cleaning of cerumen which was obscuring full visualization and complete assessment of both TMs. Under high power magnification, the right ear was examined and cleaned of cerumen using curet, alligator forceps, and suction.  After cleaning, TM is fully visualized and has normal position with normal middle ear aeration. The left ear was then cleaned and inspected using microscope, instruments and similar techniques. After cleaning of cerumen, the TM has normal position with normal aeration to middle ear.  Nose: Sinuses were nontender. Anterior rhinoscopy revealed midline septum and absence of purulence or polyps.  Oral Cavity: Normal tongue, floor of mouth, buccal mucosa, and palate. No lesions or masses on inspection or palpation. No abnormal lymph tissue in the oropharynx.   Neck: The parotid is soft without masses. Supple with normal laryngeal and tracheal landmarks.   Lymphatic: There is no palpable lymphadenopathy or other masses in the neck.   Neurologic: Alert and oriented x 3. Cranial nerves III-XI within normal limits. Voice quality normal.  Cerebellar Function Tests:  Grossly normal    Audiogram: Audiogram performed shows a bilateral mild to moderate downsloping high-frequency sensorineural hearing loss.  It looks symmetrical.  100% discrimination in both ears.  Normal type A  tympanograms bilaterally.    VNG: Vestibular testing completed recently, it shows normal responses both vestibular system to caloric responses.  No asymmetry noted.      IMPRESSION AND PLAN:   1. Dizziness: Discussed with her probable postural hypotension and to be careful when she gets up quickly, once it passes she should be fine.  Also possible BPV and discussed this as well in detail.  See no concerning problems with the inner ear and recommended she undergo vestibular rehabilitation through physical therapy to help both with the possible BPV but also with unsteadiness when she is moving.  Again reassured her that ears look fine today.  2. Bilateral sensorineural hearing loss: Continue to maximize use with hearing aids.  Protect ears from any significant noise exposure.  3. Bilateral tinnitus: Secondary to sensorineural hearing loss, no treatment needed, monitor.  4. Excessive cerumen: Cleaned today, no further treatment needed, monitor.    Thank you very much for the opportunity to participate in the care of your patient.    Rick L Nissen MD

## 2020-03-06 ENCOUNTER — THERAPY VISIT (OUTPATIENT)
Dept: PHYSICAL THERAPY | Facility: CLINIC | Age: 80
End: 2020-03-06
Attending: OTOLARYNGOLOGY
Payer: MEDICARE

## 2020-03-06 DIAGNOSIS — H81.11 BENIGN PAROXYSMAL POSITIONAL VERTIGO, RIGHT: Primary | ICD-10-CM

## 2020-03-06 DIAGNOSIS — R42 DIZZINESS: ICD-10-CM

## 2020-03-06 DIAGNOSIS — R26.89 IMPAIRMENT OF BALANCE: ICD-10-CM

## 2020-03-06 NOTE — PROGRESS NOTES
Rehabilitation Services        OUTPATIENT PHYSICAL THERAPY FUNCTIONAL EVALUATION  PLAN OF TREATMENT FOR OUTPATIENT REHABILITATION  (COMPLETE FOR INITIAL CLAIMS ONLY)  Patient's Last Name, First Name, M.I.  YOB: 1940  Camille Thompson     Provider's Name   Mabel West, PT   Medical Record No.  [id number]     Start of Care Date:  03/06/20   Onset Date:  02/17/20(Referral date used)   Type:     _X__PT   ____OT  ____SLP Medical Diagnosis:  Dizziness R42     PT Diagnosis:  s/s BPPV with gait instability Visits from SOC:  1                              __________________________________________________________________________________  Plan of Treatment/Functional Goals:  balance training, neuromuscular re-education(Canalith repositioning maneuvers, vestibular therapy)           GOALS  HEP  Patient will demonstrate understanding and compliance to her HEP for continued wellbeing upon discharge from skilled physical therapy.  05/01/20    DHI  Patient will complete the DHI with a score of <4/100 to demonstrate decreased perception of handicap and improved quality of life.   05/01/20    Position changes  Patient will deny dizziness with change of body position for independent bed mobility and transfers for return to daily activities without limitation.  05/01/20    DGI  Patient will complete the DGI with a score of 20/24 to demonstrate improved balance and decreased risk for falls.  05/01/20    Balance  Patient will maintain her balance with feet together and eyes closed on foam for 30 seconds to demonstrate improved vestibular function and balance with low vision for safety with going to the bathroom at night.  05/01/20                                     Therapy Frequency:  1 time/week(Decreasing in frequency as indicated)   Predicted Duration of Therapy Intervention:  8 weeks    Mabel West PT                                     I CERTIFY THE NEED FOR THESE SERVICES FURNISHED UNDER        THIS PLAN OF TREATMENT AND WHILE UNDER MY CARE     (Physician attestation of this document indicates review and certification of the therapy plan).                Certification Date From:  03/06/20   Certification Date To:  05/01/20    Referring Provider:  Nissen, Rick L, MD    Initial Assessment  See Epic Evaluation- Start of Care Date: 03/06/20 03/06/20 0700   Quick Adds   Quick Adds Certification;Vestibular Eval   Type of Visit Initial OP PT Evaluation   General Information   Start of Care Date 03/06/20   Referring Physician Nissen, Rick L, MD   Orders Evaluate and Treat as Indicated   Order Date 02/17/20   Medical Diagnosis Dizziness R42   Onset of illness/injury or Date of Surgery 02/17/20  (Referral date used)   Precautions/Limitations fall precautions   Surgical/Medical history reviewed Yes   Pertinent history of current vestibular problem (include personal factors and/or comorbidities that impact the POC)  Motion sickness;Migraines;Hearing loss  (No migraines in the last couple of years)   Hearing Loss Comments Bilaterally, chronic   Pertinent history of current problem (include personal factors and/or comorbidities that impact the POC) Patient has a PMH of basal cell carcinoma, neuropathy in B feet and hands, hearing loss and tinnitus. She has had positional dizziness for many years, she is unable to lay flat, usually sleeps with several pillows or on her side.  More recently she is noticed problems when she is getting up from bed, she notices some off balance and lightheadedness which only last for seconds to a minute, once it passes she is fine. Patient notes nausea and dizziness with several falls, most recently about 8 months ago. Patient notes she hangs on to things more now  as she passes. Patient bought a SPC but has not really used it yet because she doesn't want to. Patient notes she has to hold on for stair  negotiation. Patient has started working with a  yesterday in order to gain strength. Patient denies aural fullness and neck injury. Patient notes her dizziness is short but she feels unsteady for much longer.    Pertinent Visual History  Glasses, progressive bifocals, no acute changes   Prior level of function comment Patient previously IND with all ADLs and functional mobility, currently has to do everything slower and more carefully   Current Community Support Family/friend caregiver  ()   Patient role/Employment history Retired   Living environment House/[facility]   Home/Community Accessibility Comments Lives with her  in a multi story house, holds on to descend stairs, ascending is fine.    Assistive Devices Comments Has a SPC but has not used it   Patient/Family Goals Statement Improve dizziness    Fall Risk Screen   Fall screen completed by PT   Have you fallen 2 or more times in the past year? Yes   Have you fallen and had an injury in the past year? No   Timed Up and Go score (seconds) NT, see 4-Item DGI   Is patient a fall risk? Yes;Department fall risk interventions implemented   System Outcome Measures   Outcome Measures BPPV   Dizziness Handicap Inventory (score out of 100) A decrease in score by 17.18 or greater indicates a clinically significant change in symptoms. 20   Pain   Patient currently in pain No   Pain comments Takes gabapentin for neuropathy   Cognitive Status Examination   Orientation orientation to person, place and time   Level of Consciousness alert   Follows Commands and Answers Questions 100% of the time;able to follow multistep instructions   Personal Safety and Judgment intact   Memory intact   Integumentary   Integumentary No deficits were identified   Posture   Posture Forward head position   Transfer Skills   Transfer Comments IND but slowed due to instability    Gait   Gait Comments Patient ambulates with wide YAIR and decreased gait speed, mild path  deviation and arms in low guard position frequently reaching for the wall, increased instability noted with dynamic head movements   Gait Special Tests   Gait Special Tests DYNAMIC GAIT INDEX   Gait Special Tests Dynamic Gait Index   Score out of 24 8/12   Comments 4-Item DGI, <9/12 indicates increased risk for falls   Balance Special Tests   Balance Special Tests Modified CTSIB Conditions   Balance Special Tests Modified CTSIB Conditions   Condition 1, seconds 30 Seconds   Condition 2, seconds 30 Seconds   Condition 4, seconds 30 Seconds   Condition 5, seconds 2 Seconds   Modified CTSIB Comments Impaired sensory organization   Sensory Examination   Sensory Perception other (describe)   Sensory Perception Comments Notes numbness and tingling in B feet and hands   Coordination   Coordination no deficits were identified   Muscle Tone   Muscle Tone no deficits were identified   Cervicogenic Screen   Neck ROM WFL for positional testing   Oculomotor Exam   Smooth Pursuit Other   Smooth Pursuit Comment Saccadic intrusions with eye strain while looking up   Saccades Normal   Saccades Comments 1 beat corrective saccade, hypometric   VOR Normal   Rapid Head Thrust Corrective Saccade R head thrust   Convergence Testing Normal   Infrared Goggle Exam or Frenzel Lense Exam   Vestibular Suppressant in Last 24 Hours? No   Exam completed with Frenzel Lenses   Spontaneous Nystagmus Negative   Gaze Evoked Nystagmus Negative   Head Shake Horizontal Nystagmus Negative   [place]-Hallpike (right) Upbeating R torsional   [place]-Hallpike (right) comments Very mild nystagmus, left torsional nystagmus very mild with return to sitting   [place]-Hallpike (left) comments Symptoms but no nystagmus observed   HSCC Supine Roll Test (Right) Negative   HSCC Supine Roll Test (Left) Negative   BPPV Canal(s) R Posterior   BPPV Type Canalithasis   Modality Interventions   Planned Modality Interventions Comments Per therapist discretion   Planned Therapy  Interventions   Planned Therapy Interventions balance training;neuromuscular re-education  (Canalith repositioning maneuvers, vestibular therapy)   Clinical Impression   Criteria for Skilled Therapeutic Interventions Met yes, treatment indicated   PT Diagnosis s/s BPPV with gait instability   Influenced by the following impairments Positional testing with exacerbation of symptoms and very mild nystagmus, impaired balance, increased risk for falls   Functional limitations due to impairments Increased risk for falls, impaired safety and independence with bed mobility, transfers, functional mobility and ADLs, position avoidance   Clinical Presentation Stable/Uncomplicated   Clinical Presentation Rationale Medically stable   Clinical Decision Making (Complexity) Low complexity   Therapy Frequency 1 time/week  (Decreasing in frequency as indicated)   Predicted Duration of Therapy Intervention (days/wks) 8 weeks   Risk & Benefits of therapy have been explained Yes   Patient, Family & other staff in agreement with plan of care Yes   Clinical Impression Comments Patient is a 79 year old female presenting to physical therapy with dizziness indicative of BPPV. Patient presents with very mild nystagmus but exacerbation of symptoms bilaterally in [place]-Hallpike. One deep head hang performed due to mildness of nystagmus and exacerbation of symptoms bilaterally. One right Epley maneuver performed due to left torsional downbeating nystagmus with return to sitting. Patient may benefit from skilled physical therapy to decrease her dizziness and increased her safety and independence with functional mobility.    Education Assessment   Barriers to Learning No barriers   GOALS   PT Eval Goals 1;2;3;4;5   Goal 1   Goal Identifier HEP   Goal Description Patient will demonstrate understanding and compliance to her HEP for continued wellbeing upon discharge from skilled physical therapy.   Target Date 05/01/20   Goal 2   Goal Identifier [facility]    Goal Description Patient will complete the DHI with a score of <4/100 to demonstrate decreased perception of handicap and improved quality of life.    Target Date 05/01/20   Goal 3   Goal Identifier Position changes   Goal Description Patient will deny dizziness with change of body position for independent bed mobility and transfers for return to daily activities without limitation.   Target Date 05/01/20   Goal 4   Goal Identifier DGI   Goal Description Patient will complete the DGI with a score of 20/24 to demonstrate improved balance and decreased risk for falls.   Target Date 05/01/20   Goal 5   Goal Identifier Balance   Goal Description Patient will maintain her balance with feet together and eyes closed on foam for 30 seconds to demonstrate improved vestibular function and balance with low vision for safety with going to the bathroom at night.   Target Date 05/01/20   Total Evaluation Time   PT Eval, Low Complexity Minutes (25336) 30   Therapy Certification   Certification date from 03/06/20   Certification date to 05/01/20   Medical Diagnosis Dizziness R42   Certification I certify the need for these services furnished under this plan of treatment and while under my care.  (Physician co-signature of this document indicates review and certification of the therapy plan).

## 2020-03-15 ENCOUNTER — HEALTH MAINTENANCE LETTER (OUTPATIENT)
Age: 80
End: 2020-03-15

## 2020-12-04 ENCOUNTER — VIRTUAL VISIT (OUTPATIENT)
Dept: ONCOLOGY | Facility: CLINIC | Age: 80
End: 2020-12-04
Attending: INTERNAL MEDICINE
Payer: MEDICARE

## 2020-12-04 DIAGNOSIS — C50.912 MALIGNANT NEOPLASM OF LEFT BREAST IN FEMALE, ESTROGEN RECEPTOR NEGATIVE, UNSPECIFIED SITE OF BREAST (H): Primary | ICD-10-CM

## 2020-12-04 DIAGNOSIS — Z17.1 MALIGNANT NEOPLASM OF LEFT BREAST IN FEMALE, ESTROGEN RECEPTOR NEGATIVE, UNSPECIFIED SITE OF BREAST (H): Primary | ICD-10-CM

## 2020-12-04 PROCEDURE — 99213 OFFICE O/P EST LOW 20 MIN: CPT | Mod: 95 | Performed by: INTERNAL MEDICINE

## 2020-12-04 RX ORDER — OMEGA-3 FATTY ACIDS/FISH OIL 300-1000MG
200 CAPSULE ORAL EVERY 4 HOURS PRN
COMMUNITY

## 2020-12-04 NOTE — LETTER
"    12/4/2020         RE: Camille hTompson  [address] [place] [address] [place] [address]        Dear Colleague,    Thank you for referring your patient, Camille Thompson, to the [facility]. Please see a copy of my visit note below.    Camille Thompson is a 80 year old female who is being evaluated via a billable video visit.      The patient has been notified of following:     \"This video visit will be conducted via a call between you and your physician/provider. We have found that certain health care needs can be provided without the need for an in-person physical exam.  This service lets us provide the care you need with a video conversation.  If a prescription is necessary we can send it directly to your pharmacy.  If lab work is needed we can place an order for that and you can then stop by our lab to have the test done at a later time.    Video visits are billed at different rates depending on your insurance coverage.  Please reach out to your insurance provider with any questions.    If during the course of the call the physician/provider feels a video visit is not appropriate, you will not be charged for this service.\"    Patient has given verbal consent for Video visit? Yes  How would you like to obtain your AVS? Mail a copy  If you are dropped from the video visit, the video invite should be resent to: Text to cell phone: [phone]  Will anyone else be joining your video visit? No      Vitals - Patient Reported  Weight (Patient Reported): 69.4 kg (153 lb)  Pain Score: Mild Pain (3)  Pain Loc: (PATIENT REPORTS PAIN IN LOWER BACK)        I have reviewed and updated patient's allergy and medication list.    Concerns: NONE  Refills: NONE      Eve Garnett [facility]      Video-Visit Details    Type of service:  Video Visit    Video Start Time: 1003  Video End Time: 1024    Originating Location (pt. Location): Home    Distant Location (provider location):  [address] [facility] office    Platform used for " Video Visit: AmWell but converted to phone call.    [facility]  FOLLOW-UP VISIT NOTE  Date of visit: Dec 4, 2020        REASON FOR VISIT: breast cancer, coming in for routine 6 month follow up    HPI: .80 year old  non- woman with Stage I (T1 N0 M0) infiltrating ductal carcinoma of the left breast, ER negative, RI negative, HER-2/batsheva negative. The patient is status post bilateral mastectomy on 01/29/2015. She had reconstruction with implant saline. Pathology revealed an infiltrating ductal carcinoma, Sara grade 3. Tumor size was 1.1 cm. Margins were not involved. She had zero of 5 sentinel lymph nodes positive. The right breast showed no malignancy. The patient completed weekly Taxol x 11 from 03/06/2015 to 05/15/2015. Last dose of Taxol was held due to LE erythema.  She completed dose dense AC (adriamycin/cytoxan) x 4 cycles from 06/05/2015 to 07/24/2015.    INTERVAL HISTORY: Ms. Thompson fell on her back about 3 months ago and had vertebral collapse. This was diagnosed by MRI and Xray. She has seen an orthopedist and is now getting physical therapy. Her pain is getting a little better and has just started with physical therapy. She thinks her residual neuropathy might have been responsible for the fall.    She also noted fleeting breast pain. She had saline implants and the size has not changed, It self resolved and we discussed the possibility of breast MRI to insure implant integrity if it were to happen again.    Otherwise she has done fairly well. She had audiology and now has hearing aids. She has not been exercising much because of the back pain.ths or so.     Her neuropathy remains stable. 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     She has not been exercising regularly    ROS is otherwise negative. She has no new symptoms except for above    EXAM:  There were no vitals taken for this visit.  Wt Readings from Last 4 Encounters:   02/11/20 68.9 kg  (152 lb)   12/06/19 68.9 kg (151 lb 12.8 oz)   06/03/19 68 kg (150 lb)   11/30/18 64.4 kg (142 lb)     She was seen only briefly on video but she looked well    LABS: none    IMAGING:     Outside CT on 10/6/2020 shows L1 compression fracture with retropulsion of superior endplate into the spinal canal resulting in mild spinal canal narrowing.    PROBLEMS:     1. 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 Low bone density  3. 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 Thompson's main concern is her back pain. She will get PT and hopefully continue to improve. Otherwise she has done reasonably well and knows to contact us if her breast pain reoccurs.    PLAN:  1 RTC in 1 year  2. Contact us sooner as necessary. Especially if breast pain recurs and we should get breast MRI    Nacho Chavez MD

## 2020-12-04 NOTE — PROGRESS NOTES
"Camille Thompson is a 80 year old female who is being evaluated via a billable video visit.      The patient has been notified of following:     \"This video visit will be conducted via a call between you and your physician/provider. We have found that certain health care needs can be provided without the need for an in-person physical exam.  This service lets us provide the care you need with a video conversation.  If a prescription is necessary we can send it directly to your pharmacy.  If lab work is needed we can place an order for that and you can then stop by our lab to have the test done at a later time.    Video visits are billed at different rates depending on your insurance coverage.  Please reach out to your insurance provider with any questions.    If during the course of the call the physician/provider feels a video visit is not appropriate, you will not be charged for this service.\"    Patient has given verbal consent for Video visit? Yes  How would you like to obtain your AVS? Mail a copy  If you are dropped from the video visit, the video invite should be resent to: Text to cell phone: [phone]  Will anyone else be joining your video visit? No      Vitals - Patient Reported  Weight (Patient Reported): 69.4 kg (153 lb)  Pain Score: Mild Pain (3)  Pain Loc: (PATIENT REPORTS PAIN IN LOWER BACK)        I have reviewed and updated patient's allergy and medication list.    Concerns: NONE  Refills: NONE      Eve Garnett CMA      Video-Visit Details    Type of service:  Video Visit    Video Start Time: 1003  Video End Time: 1024    Originating Location (pt. Location): Home    Distant Location (provider location):  [address] [place] [address] office    Platform used for Video Visit: [employer] but converted to phone call.    [facility]  FOLLOW-UP VISIT NOTE  Date of visit: Dec 4, 2020        REASON FOR VISIT: breast cancer, coming in for routine 6 month follow up    HPI: .80 year old "  non- woman with Stage I (T1 N0 M0) infiltrating ductal carcinoma of the left breast, ER negative, IN negative, HER-2/batsheva negative. The patient is status post bilateral mastectomy on 01/29/2015. She had reconstruction with implant saline. Pathology revealed an infiltrating ductal carcinoma, [place] grade 3. Tumor size was 1.1 cm. Margins were not involved. She had zero of 5 sentinel lymph nodes positive. The right breast showed no malignancy. The patient completed weekly Taxol x 11 from 03/06/2015 to 05/15/2015. Last dose of Taxol was held due to LE erythema.  She completed dose dense AC (adriamycin/cytoxan) x 4 cycles from 06/05/2015 to 07/24/2015.    INTERVAL HISTORY: Ms. Thompson fell on her back about 3 months ago and had vertebral collapse. This was diagnosed by MRI and Xray. She has seen an orthopedist and is now getting physical therapy. Her pain is getting a little better and has just started with physical therapy. She thinks her residual neuropathy might have been responsible for the fall.    She also noted fleeting breast pain. She had saline implants and the size has not changed, It self resolved and we discussed the possibility of breast MRI to insure implant integrity if it were to happen again.    Otherwise she has done fairly well. She had audiology and now has hearing aids. She has not been exercising much because of the back pain.ths or so.     Her neuropathy remains stable. 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     She has not been exercising regularly    ROS is otherwise negative. She has no new symptoms except for above    EXAM:  There were no vitals taken for this visit.  Wt Readings from Last 4 Encounters:   02/11/20 68.9 kg (152 lb)   12/06/19 68.9 kg (151 lb 12.8 oz)   06/03/19 68 kg (150 lb)   11/30/18 64.4 kg (142 lb)     She was seen only briefly on video but she looked well    LABS: none    IMAGING:     Outside CT on 10/6/2020 shows L1 compression fracture with  retropulsion of superior endplate into the spinal canal resulting in mild spinal canal narrowing.    PROBLEMS:     1. 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 Low bone density  3. 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 Thompson's main concern is her back pain. She will get PT and hopefully continue to improve. Otherwise she has done reasonably well and knows to contact us if her breast pain reoccurs.    PLAN:  1 RTC in 1 year  2. Contact us sooner as necessary. Especially if breast pain recurs and we should get breast MRI    Nacho Chavez MD

## 2021-05-09 ENCOUNTER — HEALTH MAINTENANCE LETTER (OUTPATIENT)
Age: 81
End: 2021-05-09

## 2021-10-24 ENCOUNTER — HEALTH MAINTENANCE LETTER (OUTPATIENT)
Age: 81
End: 2021-10-24

## 2021-12-03 ENCOUNTER — ONCOLOGY VISIT (OUTPATIENT)
Dept: ONCOLOGY | Facility: CLINIC | Age: 81
End: 2021-12-03
Attending: INTERNAL MEDICINE
Payer: MEDICARE

## 2021-12-03 VITALS
OXYGEN SATURATION: 99 % | HEART RATE: 85 BPM | BODY MASS INDEX: 20.49 KG/M2 | DIASTOLIC BLOOD PRESSURE: 76 MMHG | TEMPERATURE: 98.1 F | SYSTOLIC BLOOD PRESSURE: 138 MMHG | RESPIRATION RATE: 16 BRPM | WEIGHT: 143.1 LBS

## 2021-12-03 DIAGNOSIS — Z17.1 MALIGNANT NEOPLASM OF LEFT BREAST IN FEMALE, ESTROGEN RECEPTOR NEGATIVE, UNSPECIFIED SITE OF BREAST (H): Primary | ICD-10-CM

## 2021-12-03 DIAGNOSIS — C50.912 MALIGNANT NEOPLASM OF LEFT BREAST IN FEMALE, ESTROGEN RECEPTOR NEGATIVE, UNSPECIFIED SITE OF BREAST (H): Primary | ICD-10-CM

## 2021-12-03 PROCEDURE — G0463 HOSPITAL OUTPT CLINIC VISIT: HCPCS

## 2021-12-03 PROCEDURE — 99214 OFFICE O/P EST MOD 30 MIN: CPT | Performed by: INTERNAL MEDICINE

## 2021-12-03 ASSESSMENT — PAIN SCALES - GENERAL: PAINLEVEL: NO PAIN (0)

## 2021-12-03 NOTE — NURSING NOTE
"Oncology Rooming Note    December 3, 2021 9:56 AM   Camille Thompson is a 81 year old female who presents for:    Chief Complaint   Patient presents with     Oncology Clinic Visit     Breast Cancer      Initial Vitals: /76   Pulse 85   Temp 98.1  F (36.7  C) (Oral)   Resp 16   Wt 64.9 kg (143 lb 1.6 oz)   SpO2 99%   BMI 20.49 kg/m   Estimated body mass index is 20.49 kg/m  as calculated from the following:    Height as of 2/11/20: 1.78 m (5' 10.08\").    Weight as of this encounter: 64.9 kg (143 lb 1.6 oz). Body surface area is 1.79 meters squared.  No Pain (0) Comment: Data Unavailable   No LMP recorded. Patient is postmenopausal.  Allergies reviewed: Yes  Medications reviewed: Yes    Medications: Medication refills not needed today.  Pharmacy name entered into EPIC:    [facility] - [place], [place] - [address] [id number] - [place], [place] - [address] [facility] Snapchat    Clinical concerns: None       Angela Wild LPN            "

## 2021-12-03 NOTE — PROGRESS NOTES
[facility]  FOLLOW-UP VISIT NOTE  Date of visit: Dec 3, 2021        REASON FOR VISIT: breast cancer, coming in for routine 6 month follow up    HPI:  81 year old year old  non- woman with Stage I (T1 N0 M0) infiltrating ductal carcinoma of the left breast, ER negative, MD negative, HER-2/batsheva negative. The patient is status post bilateral mastectomy on 01/29/2015. She had reconstruction with implant saline. Pathology revealed an infiltrating ductal carcinoma, [place] grade 3. Tumor size was 1.1 cm. Margins were not involved. She had zero of 5 sentinel lymph nodes positive. The right breast showed no malignancy. The patient completed weekly Taxol x 11 from 03/06/2015 to 05/15/2015. Last dose of Taxol was held due to LE erythema.  She completed dose dense AC (adriamycin/cytoxan) x 4 cycles from 06/05/2015 to 07/24/2015.    INTERVAL HISTORY: Ms. Mckeonurns after about a year.  Since I last saw her she has done very well.  When I saw her last year she had trauma to her back.  She says this is better and has been recovering with the help of physical therapy.  She lost a little bit of weight.  She has lost 10 pounds due to what she describes as careful eating.  Because of this she wants to have her breast implants reduced.  She has a surgery scheduled with Dr. Marj Grey in mid January to have that done.    She thinks her neuropathy might be a little bit worse.  However she is managing to compensate for that.  She has been seeing a .  They are working on her core strength and balance.    Otherwise she has had no new health concerns.  Review of systems is completely unremarkable except for as above.  Her only lingering concern related to her chemotherapy is the neuropathy. She has had her COVID vaccination and booster.    EXAM:  /76   Pulse 85   Temp 98.1  F (36.7  C) (Oral)   Resp 16   Wt 64.9 kg (143 lb 1.6 oz)   SpO2 99%   BMI  20.49 kg/m    Wt Readings from Last 4 Encounters:   12/03/21 64.9 kg (143 lb 1.6 oz)   02/11/20 68.9 kg (152 lb)   12/06/19 68.9 kg (151 lb 12.8 oz)   06/03/19 68 kg (150 lb)     Vital signs were reviewed.   Patient alert and oriented x3.   PERRLA. EOMI. No scleral icterus noted. No nystagmus  Neck exam: No palpable cervical, supraclavicular or axillary nodes bilaterally.   Heart: RRR no murmurs noted.   Chest: bilateral reconstructions are intact. There is some erythema around her bra line. She says this is not different than usual.   Lungs: clear to auscultation bilaterally.  No crackles or wheezing.   Abd: positive bowel sounds in all four quadrants.  No tenderness to palpation.  No hepatomegaly.   Extremities: No lower extremity edema.   Neuro: grossly intact. Cranial nerves intact. No cerebellar signs  Mood and affect is stable.        LABS: none    IMAGING:  None    PROBLEMS:     1. 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 Low bone density  3. 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 Thompson has done well. She will get her implants replaced for a smaller size. We discussed the fact that she's passed through the highest risk period for TNBC and I could see her PRN.    She's OK with not having a return appointment. She knows how to contact me as needed.    PLAN:  1 RTC prn, I will not schedule a regular visit.    Nacho Chavez MD

## 2021-12-03 NOTE — LETTER
12/3/2021         RE: Camille Thompson  [address] [place] [address] [place] [address]        Dear Colleague,    Thank you for referring your patient, Camille Thompson, to the [facility]. Please see a copy of my visit note below.        [facility]  FOLLOW-UP VISIT NOTE  Date of visit: Dec 3, 2021    REASON FOR VISIT: breast cancer, coming in for routine 6 month follow up    HPI:  81 year old year old  non- woman with Stage I (T1 N0 M0) infiltrating ductal carcinoma of the left breast, ER negative, [place] negative, HER-2/batsheva negative. The patient is status post bilateral mastectomy on 01/29/2015. She had reconstruction with implant saline. Pathology revealed an infiltrating ductal carcinoma, [place] grade 3. Tumor size was 1.1 cm. Margins were not involved. She had zero of 5 sentinel lymph nodes positive. The right breast showed no malignancy. The patient completed weekly Taxol x 11 from 03/06/2015 to 05/15/2015. Last dose of Taxol was held due to LE erythema.  She completed dose dense AC (adriamycin/cytoxan) x 4 cycles from 06/05/2015 to 07/24/2015.    INTERVAL HISTORY: Ms. Thompson eturns after about a year.  Since I last saw her she has done very well.  When I saw her last year she had trauma to her back.  She says this is better and has been recovering with the help of physical therapy.  She lost a little bit of weight.  She has lost 10 pounds due to what she describes as careful eating.  Because of this she wants to have her breast implants reduced.  She has a surgery scheduled with Dr. Marj Grey in mid January to have that done.    She thinks her neuropathy might be a little bit worse.  However she is managing to compensate for that.  She has been seeing a .  They are working on her core strength and balance.    Otherwise she has had no new health concerns.  Review of systems is completely unremarkable except for as above.   Her only lingering concern related to her chemotherapy is the neuropathy. She has had her COVID vaccination and booster.    EXAM:  /76   Pulse 85   Temp 98.1  F (36.7  C) (Oral)   Resp 16   Wt 64.9 kg (143 lb 1.6 oz)   SpO2 99%   BMI 20.49 kg/m    Wt Readings from Last 4 Encounters:   12/03/21 64.9 kg (143 lb 1.6 oz)   02/11/20 68.9 kg (152 lb)   12/06/19 68.9 kg (151 lb 12.8 oz)   06/03/19 68 kg (150 lb)     Vital signs were reviewed.   Patient alert and oriented x3.   PERRLA. EOMI. No scleral icterus noted. No nystagmus  Neck exam: No palpable cervical, supraclavicular or axillary nodes bilaterally.   Heart: RRR no murmurs noted.   Chest: bilateral reconstructions are intact. There is some erythema around her bra line. She says this is not different than usual.   Lungs: clear to auscultation bilaterally.  No crackles or wheezing.   Abd: positive bowel sounds in all four quadrants.  No tenderness to palpation.  No hepatomegaly.   Extremities: No lower extremity edema.   Neuro: grossly intact. Cranial nerves intact. No cerebellar signs  Mood and affect is stable.      LABS: none    IMAGING:  None    PROBLEMS:     1. 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 Low bone density  3. 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 Thompson has done well. She will get her implants replaced for a smaller size. We discussed the fact that she's passed through the highest risk period for TNBC and I could see her PRN.    She's OK with not having a return appointment. She knows how to contact me as needed.    PLAN:  1 RTC prn, I will not schedule a regular visit.    Nacho Chavez MD

## 2022-06-05 ENCOUNTER — HEALTH MAINTENANCE LETTER (OUTPATIENT)
Age: 82
End: 2022-06-05

## 2022-10-15 ENCOUNTER — HEALTH MAINTENANCE LETTER (OUTPATIENT)
Age: 82
End: 2022-10-15

## 2023-06-11 ENCOUNTER — HEALTH MAINTENANCE LETTER (OUTPATIENT)
Age: 83
End: 2023-06-11

## 2023-07-13 ENCOUNTER — HOSPITAL ENCOUNTER (EMERGENCY)
Facility: CLINIC | Age: 83
Discharge: HOME | End: 2023-07-13
Attending: EMERGENCY MEDICINE | Admitting: EMERGENCY MEDICINE
Payer: MEDICARE

## 2023-07-13 ENCOUNTER — APPOINTMENT (OUTPATIENT)
Dept: ULTRASOUND IMAGING | Facility: CLINIC | Age: 83
End: 2023-07-13
Attending: PHYSICIAN ASSISTANT
Payer: MEDICARE

## 2023-07-13 VITALS
HEIGHT: 69 IN | OXYGEN SATURATION: 98 % | DIASTOLIC BLOOD PRESSURE: 60 MMHG | BODY MASS INDEX: 20.73 KG/M2 | SYSTOLIC BLOOD PRESSURE: 132 MMHG | HEART RATE: 81 BPM | RESPIRATION RATE: 16 BRPM | WEIGHT: 140 LBS | TEMPERATURE: 98.1 F

## 2023-07-13 DIAGNOSIS — I82.412 ACUTE DEEP VEIN THROMBOSIS (DVT) OF FEMORAL VEIN OF LEFT LOWER EXTREMITY (H): ICD-10-CM

## 2023-07-13 PROBLEM — F32.89 OTHER SPECIFIED DEPRESSIVE EPISODES: Status: ACTIVE | Noted: 2021-08-31

## 2023-07-13 PROBLEM — N39.3 SUI (STRESS URINARY INCONTINENCE, FEMALE): Status: ACTIVE | Noted: 2021-09-27

## 2023-07-13 PROBLEM — Z85.3 PERSONAL HISTORY OF MALIGNANT NEOPLASM OF BREAST: Status: ACTIVE | Noted: 2021-07-13

## 2023-07-13 PROBLEM — M19.041 PRIMARY OSTEOARTHRITIS OF RIGHT HAND: Status: ACTIVE | Noted: 2022-10-14

## 2023-07-13 PROBLEM — M54.9 BACKACHE: Status: ACTIVE | Noted: 2020-12-07

## 2023-07-13 PROBLEM — M80.08XD AGE-RELATED OSTEOPOROSIS WITH CURRENT PATHOLOGICAL FRACTURE OF VERTEBRA WITH ROUTINE HEALING: Status: ACTIVE | Noted: 2020-12-28

## 2023-07-13 PROCEDURE — 93970 EXTREMITY STUDY: CPT

## 2023-07-13 PROCEDURE — 99284 EMERGENCY DEPT VISIT MOD MDM: CPT | Mod: 25

## 2023-07-13 RX ORDER — APIXABAN 5 MG (74)
KIT ORAL
Qty: 74 EACH | Refills: 0 | Status: SHIPPED | OUTPATIENT
Start: 2023-07-13 | End: 2023-08-12

## 2023-07-13 ASSESSMENT — ACTIVITIES OF DAILY LIVING (ADL): ADLS_ACUITY_SCORE: 35

## 2023-07-13 NOTE — DISCHARGE INSTRUCTIONS
Do not take ibuprofen or aspirin while on blood thinners.    If you fall, hit your head, you came to the emergency department immediately for CT scan.

## 2023-07-13 NOTE — ED NOTES
Expected Patient Referral to ED  2:51 PM    Referring Clinic/Provider:  [facility]    Reason for referral/Clinical facts:  Fractures clavicle June 18th, healing well  H/o breast cancer with residual peripheral neuropathy    Increased swelling in the legs, worse in the left than the right  Flight to [place] one week ago    Ultrasound is down, D-dimer is elevated in urgent care    No shortness of breath    Recommendations provided:  Send to ED for further evaluation    Caller was informed that this institution does possess the capabilities and/or resources to provide for patient and should be transferred to our facility.    Discussed that if direct admit is sought and any hurdles are encountered, this ED would be happy to see the patient and evaluate.    Informed caller that recommendations provided are recommendations based only on the facts provided and that they responsible to accept or reject the advice, or to seek a formal in person consultation as needed and that this ED will see/treat patient should they arrive.      Irene Hooks MD  [facility] EMERGENCY ROOM  [facility] [facility], [place] [address] [phone]       Irene Hooks MD  07/13/23 4261

## 2023-07-13 NOTE — ED TRIAGE NOTES
Patient reports bilateral leg pain and swelling. Pain starts in the ankle and radiates up the calf. Pain has been present for about 2.5 weeks and is getting worse      Triage Assessment     Row Name 07/13/23 1524       Triage Assessment (Adult)    Airway WDL WDL       Respiratory WDL    Respiratory WDL WDL       Skin Circulation/Temperature WDL    Skin Circulation/Temperature WDL WDL       Cardiac WDL    Cardiac WDL WDL       Peripheral/Neurovascular WDL    Peripheral Neurovascular WDL WDL       Cognitive/Neuro/Behavioral WDL    Cognitive/Neuro/Behavioral WDL WDL

## 2023-07-13 NOTE — ED PROVIDER NOTES
EMERGENCY DEPARTMENT ENCOUNTER      NAME: Camille Thompson  AGE: 82 year old female  YOB: 1940  MRN: [id number]  EVALUATION DATE & TIME: No admission date for patient encounter.    PCP: Reanna Bedolla    ED PROVIDER: Froy Ag M.D.      Chief Complaint   Patient presents with     Leg Pain     Bilateral          FINAL IMPRESSION:  1. Acute deep vein thrombosis (DVT) of femoral vein of left lower extremity (H)          ED COURSE & MEDICAL DECISION MAKING:    Pertinent Labs & Imaging studies reviewed below.  All EKGs below represent my independent interpretation.   ED Course as of 07/13/23 2345   Thu Jul 13, 2023   1616 Patient is an 82-year-old woman who is here with intervention for breast cancer, here with mild lower extremity swelling for the last couple of weeks, noticed after a trip to [place].  Some tenderness to the left lower leg by the ankle.  Sent here for DVT work-up by clinic.  Her D-dimer was mildly elevated at 1.  On exam she has no significant swelling or erythema to the bilateral lower extremities although she is tender along the left lower anterior shin.  Ultrasounds ordered to rule out blood clot, if negative I suspect venous insufficiency is likely cause of her symptoms.   1635 US Lower Extremity Venous Duplex Bilateral  1.  Short segment DVT in the left lower extremity below the knee. No other deep venous thrombosis.   2.  Lower extremity edema.   I explained findings with the patient, discussed options for anticoagulation and recommendation for Eliquis.  No chest pain, shortness of breath, tachycardia.  She was discharged with Eliquis starter pack written prescription and she will follow-up with her primary care physician this coming week for reevaluation.    We discussed importance of returning to the emergency department if she falls or hits her head while on blood thinners.      Additional ED Course Timestamps:  3:32 PM I met with the patient, obtained history, performed an  initial exam, and discussed options and plan for diagnostics and treatment here in the ED.  5:22 PM I rechecked and updated patient on results. We discussed the plan for discharge and the patient is agreeable. Reviewed supportive cares, symptomatic treatment, outpatient follow up, and reasons to return to the Emergency Department. Patient to be discharged by ED RN.     Medical Decision Making    History:    Supplemental history from: Documented in chart, if applicable    External Record(s) reviewed: Documented in chart, if applicable.    Work Up:    Chart documentation includes differential considered and any EKGs or imaging independently interpreted by provider, where specified.    In additional to work up documented, I considered the following work up: Documented in chart, if applicable.    External consultation:    Discussion of management with another provider: Documented in chart, if applicable    Complicating factors:    Care impacted by chronic illness: Cancer/Chemotherapy and Hypertension    Care affected by social determinants of health: N/A    Disposition considerations: Discharge. I prescribed additional prescription strength medication(s) as charted. See documentation for any additional details.        At the conclusion of the encounter I discussed the results of all of the tests and the disposition. The questions were answered. The patient or family acknowledged understanding and was agreeable with the care plan.       MEDICATIONS GIVEN IN THE EMERGENCY:  Medications - No data to display      NEW PRESCRIPTIONS STARTED AT TODAY'S ER VISIT  Discharge Medication List as of 7/13/2023  5:32 PM      START taking these medications    Details   Apixaban Starter Pack (ELIQUIS DVT/PE STARTER PACK) 5 MG TBPK Take 10 mg by mouth 2 times daily for 7 days, THEN 5 mg 2 times daily for 23 days., Disp-74 each, R-0, Local Print             "    =================================================================    HPI    Patient information was obtained from: Patient    Use of : N/A       Camille Thompson is a 82 year old female with a pertinent history of HTN, breast cancer, who presents to this ED for evaluation of leg pain and swelling.    Patient reports worsening swelling to bilateral lower legs over the last 2.5 weeks. She states that initially, swelling to right leg was worse but now notes swelling to left leg is worse. She also notes associating pain to bilateral lower legs. Otherwise, she denies any chest pain and shortness of breath. Patient denies any history of blood clots. Patient reports a history of breast cancer with double mastectomy seven years ago. No other complaints at this time.    Per chart review, patient was seen at [facility] in [place] on 7/13/23 for leg swelling. D-dimer elevated. No ultrasound imaging done. Sent to ED for DVT workup.           VITALS:  /60   Pulse 81   Temp 98.1  F (36.7  C) (Oral)   Resp 16   Ht 1.753 m (5' 9\")   Wt 63.5 kg (140 lb)   SpO2 98%   BMI 20.67 kg/m      PHYSICAL EXAM    Constitutional: Well developed, well nourished. Comfortable appearing.   HENT: Normocephalic, atraumatic, mucous membranes moist, nose normal  Eyes: PERRL, EOMI, conjunctiva normal, no discharge.  Neck- Supple, gross ROM intact.   Respiratory: Normal work of breathing, normal rate, speaks in full sentences  Cardiovascular: Normal heart rate  Musculoskeletal: Moving all 4 extremities intentionally and without pain. No lower extremity edema bilaterally. Sensitive to touch to left anterior lower leg. Wearing compression stockings.   Neurologic: Alert & oriented x 3, cranial nerves grossly intact. Normal gross coordination  Psychiatric: Affect normal, cooperative.          I, Bonnie Beck am serving as a scribe to document services personally performed by Dr. Froy Ag based on my observation " and the provider's statements to me. I, Froy Ag MD attest that Bonnie Ebony is acting in a scribe capacity, has observed my performance of the services and has documented them in accordance with my direction.    Froy Ag M.D.  Emergency Medicine  [facility] EMERGENCY ROOM  [address] [facility] [address]  [phone]  Dept: [phone]     Froy Ag MD  07/13/23 7051

## 2023-07-24 ENCOUNTER — TELEPHONE (OUTPATIENT)
Dept: INTERVENTIONAL RADIOLOGY/VASCULAR | Facility: CLINIC | Age: 83
End: 2023-07-24

## 2023-07-24 NOTE — TELEPHONE ENCOUNTER
[facility] Call Center    Phone Message    May a detailed message be left on voicemail: yes     Reason for Call: Other: Pt called and wanted to see Dr. Orosco regarding the same issue she came in before in 2019 vein incompetent. Please call her back to schedule. Thanks     Action Taken: Message routed to:  [facility] IR    Travel Screening: Not Applicable

## 2023-07-24 NOTE — TELEPHONE ENCOUNTER
Called and spoke with Pt. She noted she was recently dx w/ DVT and has been having issues with pain w/ walking. She requested F/U w/ Dr Orosco. Disucssed Dr Orosco now sees Pts through [facility] as we no longer do vein care through the [facility]. Discussed alternate locations and clinic telephones provided. Pt also inquired on need for referral. Since Pt has not been seen since 2019, she would be a new Pt. Need for referral would depend on insurance requirements. Pt directed to her insurance to confirm. Pt verbalized understanding, agreed to current plan and denied any further questions.    Julita Macario LPN

## 2024-03-17 ENCOUNTER — HEALTH MAINTENANCE LETTER (OUTPATIENT)
Age: 84
End: 2024-03-17

## 2024-08-04 ENCOUNTER — HEALTH MAINTENANCE LETTER (OUTPATIENT)
Age: 84
End: 2024-08-04

## 2025-08-16 ENCOUNTER — HEALTH MAINTENANCE LETTER (OUTPATIENT)
Age: 85
End: 2025-08-16

## (undated) RX ORDER — FENTANYL CITRATE 50 UG/ML
INJECTION, SOLUTION INTRAMUSCULAR; INTRAVENOUS
Status: DISPENSED
Start: 2017-09-29

## (undated) RX ORDER — LIDOCAINE HYDROCHLORIDE 10 MG/ML
INJECTION, SOLUTION EPIDURAL; INFILTRATION; INTRACAUDAL; PERINEURAL
Status: DISPENSED
Start: 2018-01-10

## (undated) RX ORDER — LIDOCAINE HYDROCHLORIDE 10 MG/ML
INJECTION, SOLUTION EPIDURAL; INFILTRATION; INTRACAUDAL; PERINEURAL
Status: DISPENSED
Start: 2017-09-29

## (undated) RX ORDER — DIAZEPAM 5 MG
TABLET ORAL
Status: DISPENSED
Start: 2017-09-29